# Patient Record
Sex: FEMALE | Race: BLACK OR AFRICAN AMERICAN | NOT HISPANIC OR LATINO | Employment: FULL TIME | URBAN - METROPOLITAN AREA
[De-identification: names, ages, dates, MRNs, and addresses within clinical notes are randomized per-mention and may not be internally consistent; named-entity substitution may affect disease eponyms.]

---

## 2023-03-03 LAB
CHLAMYDIA TRACH., AMPLIFIED: NEGATIVE
N. GONORRHEA, AMPLIFIED: NEGATIVE
TRICHOMONAS VAGINALIS: NEGATIVE

## 2023-03-22 LAB
AMPHETAMINE (PRESENCE) IN URINE BY SCREEN METHOD: ABNORMAL
BARBITURATES PRESENCE IN URINE BY SCREEN METHOD: ABNORMAL
BENZODIAZEPINE (PRESENCE) IN URINE BY SCREEN METHOD: ABNORMAL
CANNABINOIDS IN URINE BY SCREEN METHOD: ABNORMAL
COCAINE (PRESENCE) IN URINE BY SCREEN METHOD: ABNORMAL
DRUG SCREEN COMMENT URINE: ABNORMAL
FENTANYL URINE: ABNORMAL
METHADONE (PRESENCE) IN URINE BY SCREEN METHOD: ABNORMAL
OPIATES (PRESENCE) IN URINE BY SCREEN METHOD: ABNORMAL
OXYCODONE (PRESENCE) IN URINE BY SCREEN METHOD: ABNORMAL
PHENCYCLIDINE (PRESENCE) IN URINE BY SCREEN METHOD: ABNORMAL

## 2023-03-27 LAB
AMPHETAMINES,URINE: 4879 NG/ML
MDA,URINE: <200 NG/ML
MDEA,URINE: <200 NG/ML
MDMA,UR: <200 NG/ML
METHAMPHETAMINE QUANTITATIVE URINE: <200 NG/ML
PHENTERMINE,UR: <200 NG/ML

## 2023-11-13 DIAGNOSIS — G47.411 NARCOLEPSY AND CATAPLEXY (HHS-HCC): Primary | ICD-10-CM

## 2023-11-13 RX ORDER — DEXTROAMPHETAMINE SULFATE, DEXTROAMPHETAMINE SACCHARATE, AMPHETAMINE SULFATE AND AMPHETAMINE ASPARTATE 6.25; 6.25; 6.25; 6.25 MG/1; MG/1; MG/1; MG/1
50 CAPSULE, EXTENDED RELEASE ORAL EVERY MORNING
Qty: 60 CAPSULE | Refills: 0 | Status: SHIPPED | OUTPATIENT
Start: 2023-11-13 | End: 2024-02-12 | Stop reason: SDUPTHER

## 2023-11-13 RX ORDER — DEXTROAMPHETAMINE SULFATE, DEXTROAMPHETAMINE SACCHARATE, AMPHETAMINE SULFATE AND AMPHETAMINE ASPARTATE 6.25; 6.25; 6.25; 6.25 MG/1; MG/1; MG/1; MG/1
50 CAPSULE, EXTENDED RELEASE ORAL EVERY MORNING
Qty: 60 CAPSULE | Refills: 0 | Status: SHIPPED | OUTPATIENT
Start: 2024-01-12 | End: 2024-02-12 | Stop reason: WASHOUT

## 2023-11-13 RX ORDER — DEXTROAMPHETAMINE SULFATE, DEXTROAMPHETAMINE SACCHARATE, AMPHETAMINE SULFATE AND AMPHETAMINE ASPARTATE 6.25; 6.25; 6.25; 6.25 MG/1; MG/1; MG/1; MG/1
50 CAPSULE, EXTENDED RELEASE ORAL EVERY MORNING
Qty: 60 CAPSULE | Refills: 0 | Status: SHIPPED | OUTPATIENT
Start: 2023-12-13 | End: 2024-02-12 | Stop reason: WASHOUT

## 2023-11-13 NOTE — TELEPHONE ENCOUNTER
Veronica called for refills of her Adderall XR 25 mg x2 caps daily.  She was seen last in April with plan to follow up in 4-6 months.  OARRS was checked with  no issue of concern.  3 prescriptions were sent and Veronica was notified and told to make a follow up before additional refills can be sent.  She understands the plan and agrees. BP

## 2023-11-22 ENCOUNTER — CLINICAL SUPPORT (OUTPATIENT)
Dept: OBSTETRICS AND GYNECOLOGY | Facility: HOSPITAL | Age: 23
End: 2023-11-22
Payer: COMMERCIAL

## 2023-11-22 DIAGNOSIS — Z30.42 DEPO-PROVERA CONTRACEPTIVE STATUS: ICD-10-CM

## 2023-11-22 PROCEDURE — 2500000004 HC RX 250 GENERAL PHARMACY W/ HCPCS (ALT 636 FOR OP/ED)

## 2023-11-22 PROCEDURE — 96372 THER/PROPH/DIAG INJ SC/IM: CPT

## 2023-11-22 RX ORDER — MEDROXYPROGESTERONE ACETATE 150 MG/ML
150 INJECTION, SUSPENSION INTRAMUSCULAR ONCE
Status: COMPLETED | OUTPATIENT
Start: 2023-11-22 | End: 2023-11-22

## 2023-11-22 RX ADMIN — MEDROXYPROGESTERONE ACETATE 150 MG: 150 INJECTION, SUSPENSION INTRAMUSCULAR at 15:15

## 2024-01-17 ENCOUNTER — HOSPITAL ENCOUNTER (OUTPATIENT)
Dept: RADIOLOGY | Facility: EXTERNAL LOCATION | Age: 24
Discharge: HOME | End: 2024-01-17

## 2024-01-17 DIAGNOSIS — S99.911A RIGHT ANKLE INJURY, INITIAL ENCOUNTER: ICD-10-CM

## 2024-01-19 ENCOUNTER — OFFICE VISIT (OUTPATIENT)
Dept: ORTHOPEDIC SURGERY | Facility: CLINIC | Age: 24
End: 2024-01-19
Payer: COMMERCIAL

## 2024-01-19 DIAGNOSIS — S93.601A RIGHT FOOT SPRAIN, INITIAL ENCOUNTER: Primary | ICD-10-CM

## 2024-01-19 DIAGNOSIS — S99.911A RIGHT ANKLE INJURY, INITIAL ENCOUNTER: ICD-10-CM

## 2024-01-19 PROCEDURE — 99203 OFFICE O/P NEW LOW 30 MIN: CPT | Performed by: STUDENT IN AN ORGANIZED HEALTH CARE EDUCATION/TRAINING PROGRAM

## 2024-01-19 ASSESSMENT — PAIN DESCRIPTION - DESCRIPTORS: DESCRIPTORS: ACHING

## 2024-01-19 ASSESSMENT — PAIN - FUNCTIONAL ASSESSMENT: PAIN_FUNCTIONAL_ASSESSMENT: 0-10

## 2024-01-19 ASSESSMENT — PAIN SCALES - GENERAL: PAINLEVEL_OUTOF10: 0 - NO PAIN

## 2024-01-19 NOTE — PROGRESS NOTES
ORTHOPAEDIC SURGERY HISTORY & PHYSICAL     Chief Complaint:  Right foot pain    History Of Present Illness  Veronica Moffett is a 23 y.o. female who presents for evaluation of right foot pain.  Patient reports that she missed the last step while going downstairs.  She was initially weightbearing following the injury but developed progressively worsening pain and presented to an urgent care for evaluation.  X-rays were obtained and she was made nonweightbearing lower extremity in a walking boot with crutches.  She initially had pain in both the inside and the outside of her ankle but this has resolved.  She denies prior history of trauma or associated numbness, tingling and weakness.     Past Medical History  Past Medical History:   Diagnosis Date    30 weeks gestation of pregnancy 01/15/2019    30 weeks gestation of pregnancy    38 weeks gestation of pregnancy 03/28/2019    38 weeks gestation of pregnancy    Acute vaginitis 06/14/2021    BV (bacterial vaginosis)    Anemia complicating pregnancy, unspecified trimester 03/12/2019    Iron deficiency anemia of pregnancy    Body mass index (BMI) pediatric, greater than or equal to 95th percentile for age 11/08/2017    BMI (body mass index), pediatric, 95-99% for age    Body mass index (BMI)40.0-44.9, adult 05/25/2021    BMI 40.0-44.9, adult    Elevated blood-pressure reading, without diagnosis of hypertension 06/22/2021    Elevated blood pressure reading    Encounter for examination of blood pressure without abnormal findings 04/03/2019    Encounter for blood pressure examination    Encounter for follow-up examination after completed treatment for conditions other than malignant neoplasm 04/15/2019    Postop check    Encounter for gynecological examination (general) (routine) without abnormal findings     Well woman exam with routine gynecological exam    Encounter for immunization 01/06/2016    Immunization due    Encounter for immunization 12/02/2015    Immunization due     Encounter for pregnancy test, result positive 06/22/2021    Positive urine pregnancy test    Encounter for supervision of normal pregnancy, unspecified, first trimester     Encounter for pregnancy related examination in first trimester    Encounter for supervision of normal pregnancy, unspecified, first trimester 05/04/2021    Pregnancy with uncertain dates in first trimester    Gestational (pregnancy-induced) hypertension without significant proteinuria, third trimester 04/08/2019    Gestational hypertension without significant proteinuria during pregnancy in third trimester, antepartum    Hypersomnia, unspecified 10/13/2014    Hypersomnia    Idiopathic hypersomnia with long sleep time 08/25/2017    Idiopathic hypersomnia with long sleep time    Irregular menstruation, unspecified 04/27/2021    Missed menses    Maternal care for other (suspected) fetal abnormality and damage, fetal cardiac anomalies, not applicable or unspecified 01/22/2019    Fetal ventricular septal defect affecting antepartum care of mother    Maternal care for other (suspected) fetal abnormality and damage, fetal cardiac anomalies, not applicable or unspecified 03/19/2019    Abnormal fetal echocardiogram affecting antepartum care of mother    Nasal congestion 10/12/2015    Nasal congestion    Other general symptoms and signs 02/23/2017    Flu-like symptoms    Other problems related to lifestyle     Other problems related to lifestyle    Other specified disorders of thyroid 09/01/2015    Thyroid fullness    Other specified pregnancy related conditions, unspecified trimester 03/19/2019    Carpal tunnel syndrome during pregnancy    Personal history of other diseases of the female genital tract 01/15/2019    History of amenorrhea    Personal history of other diseases of the nervous system and sense organs 04/29/2015    History of acute conjunctivitis    Personal history of other diseases of the respiratory system 01/06/2016    History of acute  pharyngitis    Personal history of other diseases of the respiratory system 2017    History of upper respiratory infection    Personal history of other diseases of the respiratory system     History of sore throat    Personal history of other drug therapy 01/15/2019    History of influenza vaccination    Personal history of other endocrine, nutritional and metabolic disease 2019    History of obesity    Personal history of other infectious and parasitic diseases 2019    History of herpes simplex infection    Personal history of other specified conditions 2017    History of dysuria    Personal history of other specified conditions 2016    History of insomnia    Sleep apnea, unspecified 2019    Sleep-disordered breathing    Streptococcal pharyngitis 2018    Strep pharyngitis    Streptococcal pharyngitis 2018    Pharyngitis due to group A beta hemolytic Streptococci    Supervision of high risk pregnancy, unspecified, third trimester 2019    High-risk pregnancy in third trimester    Supervision of pregnancy with insufficient  care, unspecified trimester 2019    Late prenatal care    Unspecified abnormal findings in urine 10/13/2014    Abnormal urine findings    Unspecified disorder of eye and adnexa 2016    Abnormal vision screen    Vitamin D deficiency, unspecified 2015    Vitamin D deficiency    Vomiting of pregnancy, unspecified 2021    Nausea and vomiting in pregnancy prior to 22 weeks gestation       Surgical History  Recent Surgeries in Orthopaedic Surgery            No cases to display             Social History  Social History     Socioeconomic History    Marital status: Single     Spouse name: Not on file    Number of children: Not on file    Years of education: Not on file    Highest education level: Not on file   Occupational History    Not on file   Tobacco Use    Smoking status: Not on file    Smokeless tobacco: Not on  file   Substance and Sexual Activity    Alcohol use: Not on file    Drug use: Not on file    Sexual activity: Not on file   Other Topics Concern    Not on file   Social History Narrative    Not on file     Social Determinants of Health     Financial Resource Strain: Not on file   Food Insecurity: Not on file   Transportation Needs: Not on file   Physical Activity: Not on file   Stress: Not on file   Social Connections: Not on file   Intimate Partner Violence: Not on file   Housing Stability: Not on file       Family History  No family history on file.     Allergies  No Known Allergies    Review of Systems  REVIEW OF SYSTEMS  Constitutional: no unplanned weight loss  Psychiatric: no suicidal ideation  ENT: no vision changes, no sinus problems  Pulmonary: no shortness of breath during office visit today  Lymphatic: no enlarged lymph nodes  Cardiovascular: no chest pain or shortness of breath during office visit today  Gastrointestinal: no stomach problems  Genitourinary: no dysuria   Skin: no rashes  Endocrine: no thyroid problems  Neurological: no headache, no numbness  Hematological: no easy bruising  Musculoskeletal:Right foot pain    Physical Exam  PHYSICAL EXAMINATION  Constitutional Exam: well developed and well nourished  Psychiatric Exam: alert and oriented, appropriate mood and behavior  Eye Exam: EOMI  Pulmonary Exam: breathing non-labored, no apparent distress  Lymphatic exam: no appreciable lymphadenopathy in the lower extremities  Cardiovascular exam: RRR to peripheral palpation, DP pulses 2+, PT 2+, toes are pink with good capillary refill, no pitting edema  Skin exam: no open lesions, rashes, abrasions or ulcerations  Neurological exam: sensation to light touch intact in both lower extremities in peripheral and dermatomal distributions (except for any abnormalities noted in musculoskeletal exam)    Musculoskeletal exam: Right lower extremity examination.  Patient pain localized previously about the CCJ  and dorsal midfoot.  She is focally tender to palpation at the dorsal midfoot only.  Patient otherwise has pain-free and supple ankle, subtalar midtarsal joint range of motion.  Patient has no significant midfoot stress with varus/valgus/supination/pronation/abduction/abduction. Patient has sensation intact to light touch grossly in a saphenous, sural, superficial peroneal, deep peroneal and tibial nerve distribution.  Patient has 5 out of 5 strength in plantarflexion, dorsiflexion and EHL. Patient has 2+ DP/PT pulse palpated.    Last Recorded Vitals  There were no vitals taken for this visit.    Laboratory Results    No results found for this or any previous visit (from the past 24 hour(s)).    Radiology Results   X-ray imaging 3 view nonweightbearing right foot reviewed from 01/17/2024 and independently evaluated by me on 01/19/2024 demonstrates dorsal avulsion injury about naviculocuneiform articulation visualized on the lateral projection.  Single view demonstrating lateral calcaneal avulsion type injury.  No obvious midfoot disruption.    Assessment/Plan:  23-year-old female who my impression has a right foot sprain without clinical evidence of midfoot disruption.  I have reviewed the diagnosis and treatment options extensively with the patient.  In my impression the patient should begin progressive weightbearing in the right lower extremity in a short walking boot.  She may utilize crutches for assisted ambulation and OTC NSAIDs for pain relief.  I will plan to see the patient back in approximately 3 weeks to follow her clinical progress.  I anticipate transitioning the patient out of the boot and into a regular shoe pending clinical and radiographic evaluation.  Upon return, patient would require 3 views weightbearing right foot.    Trenton Sousa MD, REJI  Department of Orthopaedic Surgery  [facility]    The diagnosis and treatment plan were reviewed with the patient.  All questions were answered. The patient verbalized understanding of the treatment plan. There were no barriers to understanding identified.    Note dictated with [employer] software.  Completed without full type editing and sent to avoid delay.

## 2024-01-19 NOTE — LETTER
January 19, 2024     Patient: Veronica Moffett   YOB: 2000   Date of Visit: 1/19/2024       To Whom It May Concern:    It is my medical opinion that Veronica Moffett is restricted from driving for the foreseeable future due to a new right foot injury and should therefore be permitted to work from home until seen in the office again. Her next follow up is in approximately 3 weeks and restrictions will be re-evaluated at that time.     If you have any questions or concerns, please don't hesitate to call.         Sincerely,        Trenton Sousa MD    CC: No Recipients

## 2024-02-07 ENCOUNTER — CLINICAL SUPPORT (OUTPATIENT)
Dept: OBSTETRICS AND GYNECOLOGY | Facility: HOSPITAL | Age: 24
End: 2024-02-07
Payer: COMMERCIAL

## 2024-02-07 DIAGNOSIS — Z30.42 ENCOUNTER FOR DEPO-PROVERA CONTRACEPTION: ICD-10-CM

## 2024-02-07 PROCEDURE — 96372 THER/PROPH/DIAG INJ SC/IM: CPT

## 2024-02-07 PROCEDURE — 2500000004 HC RX 250 GENERAL PHARMACY W/ HCPCS (ALT 636 FOR OP/ED)

## 2024-02-07 RX ORDER — MEDROXYPROGESTERONE ACETATE 150 MG/ML
150 INJECTION, SUSPENSION INTRAMUSCULAR ONCE
Status: COMPLETED | OUTPATIENT
Start: 2024-02-07 | End: 2024-02-07

## 2024-02-07 RX ADMIN — MEDROXYPROGESTERONE ACETATE 150 MG: 150 INJECTION, SUSPENSION INTRAMUSCULAR at 16:05

## 2024-02-07 NOTE — PROGRESS NOTES
Medication: diazePAM 5 MG TABLET    Provider: hector Teran  Preferred Contact Number: [phone]      Pharmacy:  [facility]    Patient instructed to call pharmacy directly for future refills.      Advised patient that the nurse will call if there are questions or concerns, otherwise refill processing may take 48-72 hours.      Patient identified by name and date of birth   Patient received her last injection on 11/22/2023  Patient is due for her next injection between 04/25/24 and 05/09/24  Patient last seen in 11/22/2023  Patient will need an annual exam in 9/2024  Patient denies any concerns with injection   Patient educated on the importance of compliance  Patient given injection in the left Deltoid region, patient tolerated well  Patient encouraged to schedule return visit prior to leaving the office  Patient encouraged to call office if she has nay future questions or concerns  Patient verbalized understanding   CHET Hernandez, ADN-RN

## 2024-02-09 ENCOUNTER — OFFICE VISIT (OUTPATIENT)
Dept: ORTHOPEDIC SURGERY | Facility: CLINIC | Age: 24
End: 2024-02-09
Payer: COMMERCIAL

## 2024-02-09 ENCOUNTER — HOSPITAL ENCOUNTER (OUTPATIENT)
Dept: RADIOLOGY | Facility: CLINIC | Age: 24
Discharge: HOME | End: 2024-02-09
Payer: COMMERCIAL

## 2024-02-09 DIAGNOSIS — S93.601A RIGHT FOOT SPRAIN, INITIAL ENCOUNTER: ICD-10-CM

## 2024-02-09 PROCEDURE — 73630 X-RAY EXAM OF FOOT: CPT | Mod: RT

## 2024-02-09 PROCEDURE — 99212 OFFICE O/P EST SF 10 MIN: CPT | Performed by: STUDENT IN AN ORGANIZED HEALTH CARE EDUCATION/TRAINING PROGRAM

## 2024-02-09 ASSESSMENT — PAIN - FUNCTIONAL ASSESSMENT: PAIN_FUNCTIONAL_ASSESSMENT: NO/DENIES PAIN

## 2024-02-09 NOTE — PROGRESS NOTES
ORTHOPAEDIC SURGERY PROGRESS NOTE    Chief Complaint:  Right foot pain    History Of Present Illness  Veronica Moffett is a 23 y.o. female who presents for evaluation of right foot pain.  Patient reports that she missed the last step while going downstairs.  She was initially weightbearing following the injury but developed progressively worsening pain and presented to an urgent care for evaluation.  X-rays were obtained and she was made nonweightbearing lower extremity in a walking boot with crutches.  She initially had pain in both the inside and the outside of her ankle but this has resolved.  She denies prior history of trauma or associated numbness, tingling and weakness.    02/09/2024: Patient returns for follow-up of her right foot pain.  She has been compliant with weightbearing restrictions in a walking boot.  She is currently reporting no pain.  She is anticipating transitioning out of the boot today.  She denies new numbness, tingling or weakness.     Past Medical History  Past Medical History:   Diagnosis Date    30 weeks gestation of pregnancy 01/15/2019    30 weeks gestation of pregnancy    38 weeks gestation of pregnancy 03/28/2019    38 weeks gestation of pregnancy    Acute vaginitis 06/14/2021    BV (bacterial vaginosis)    Anemia complicating pregnancy, unspecified trimester 03/12/2019    Iron deficiency anemia of pregnancy    Body mass index (BMI) pediatric, greater than or equal to 95th percentile for age 11/08/2017    BMI (body mass index), pediatric, 95-99% for age    Body mass index (BMI)40.0-44.9, adult 05/25/2021    BMI 40.0-44.9, adult    Elevated blood-pressure reading, without diagnosis of hypertension 06/22/2021    Elevated blood pressure reading    Encounter for examination of blood pressure without abnormal findings 04/03/2019    Encounter for blood pressure examination    Encounter for follow-up examination after completed treatment for conditions other than malignant neoplasm  04/15/2019    Postop check    Encounter for gynecological examination (general) (routine) without abnormal findings     Well woman exam with routine gynecological exam    Encounter for immunization 01/06/2016    Immunization due    Encounter for immunization 12/02/2015    Immunization due    Encounter for pregnancy test, result positive 06/22/2021    Positive urine pregnancy test    Encounter for supervision of normal pregnancy, unspecified, first trimester     Encounter for pregnancy related examination in first trimester    Encounter for supervision of normal pregnancy, unspecified, first trimester 05/04/2021    Pregnancy with uncertain dates in first trimester    Gestational (pregnancy-induced) hypertension without significant proteinuria, third trimester 04/08/2019    Gestational hypertension without significant proteinuria during pregnancy in third trimester, antepartum    Hypersomnia, unspecified 10/13/2014    Hypersomnia    Idiopathic hypersomnia with long sleep time 08/25/2017    Idiopathic hypersomnia with long sleep time    Irregular menstruation, unspecified 04/27/2021    Missed menses    Maternal care for other (suspected) fetal abnormality and damage, fetal cardiac anomalies, not applicable or unspecified 01/22/2019    Fetal ventricular septal defect affecting antepartum care of mother    Maternal care for other (suspected) fetal abnormality and damage, fetal cardiac anomalies, not applicable or unspecified 03/19/2019    Abnormal fetal echocardiogram affecting antepartum care of mother    Nasal congestion 10/12/2015    Nasal congestion    Other general symptoms and signs 02/23/2017    Flu-like symptoms    Other problems related to lifestyle     Other problems related to lifestyle    Other specified disorders of thyroid 09/01/2015    Thyroid fullness    Other specified pregnancy related conditions, unspecified trimester 03/19/2019    Carpal tunnel syndrome during pregnancy    Personal history of other  diseases of the female genital tract 01/15/2019    History of amenorrhea    Personal history of other diseases of the nervous system and sense organs 2015    History of acute conjunctivitis    Personal history of other diseases of the respiratory system 2016    History of acute pharyngitis    Personal history of other diseases of the respiratory system 2017    History of upper respiratory infection    Personal history of other diseases of the respiratory system     History of sore throat    Personal history of other drug therapy 01/15/2019    History of influenza vaccination    Personal history of other endocrine, nutritional and metabolic disease 2019    History of obesity    Personal history of other infectious and parasitic diseases 2019    History of herpes simplex infection    Personal history of other specified conditions 2017    History of dysuria    Personal history of other specified conditions 2016    History of insomnia    Sleep apnea, unspecified 2019    Sleep-disordered breathing    Streptococcal pharyngitis 2018    Strep pharyngitis    Streptococcal pharyngitis 2018    Pharyngitis due to group A beta hemolytic Streptococci    Supervision of high risk pregnancy, unspecified, third trimester 2019    High-risk pregnancy in third trimester    Supervision of pregnancy with insufficient  care, unspecified trimester 2019    Late prenatal care    Unspecified abnormal findings in urine 10/13/2014    Abnormal urine findings    Unspecified disorder of eye and adnexa 2016    Abnormal vision screen    Vitamin D deficiency, unspecified 2015    Vitamin D deficiency    Vomiting of pregnancy, unspecified 2021    Nausea and vomiting in pregnancy prior to 22 weeks gestation       Surgical History  Recent Surgeries in Orthopaedic Surgery            No cases to display             Social History  Social History      Socioeconomic History    Marital status: Single     Spouse name: None    Number of children: None    Years of education: None    Highest education level: None   Occupational History    None   Tobacco Use    Smoking status: None    Smokeless tobacco: None   Substance and Sexual Activity    Alcohol use: None    Drug use: None    Sexual activity: None   Other Topics Concern    None   Social History Narrative    None     Social Determinants of Health     Financial Resource Strain: Not on file   Food Insecurity: Not on file   Transportation Needs: Not on file   Physical Activity: Not on file   Stress: Not on file   Social Connections: Not on file   Intimate Partner Violence: Not on file   Housing Stability: Not on file       Family History  No family history on file.     Allergies  No Known Allergies    Review of Systems  REVIEW OF SYSTEMS  Constitutional: no unplanned weight loss  Psychiatric: no suicidal ideation  ENT: no vision changes, no sinus problems  Pulmonary: no shortness of breath during office visit today  Lymphatic: no enlarged lymph nodes  Cardiovascular: no chest pain or shortness of breath during office visit today  Gastrointestinal: no stomach problems  Genitourinary: no dysuria   Skin: no rashes  Endocrine: no thyroid problems  Neurological: no headache, no numbness  Hematological: no easy bruising  Musculoskeletal:Right foot pain    Physical Exam  PHYSICAL EXAMINATION  Constitutional Exam: well developed and well nourished  Psychiatric Exam: alert and oriented, appropriate mood and behavior  Eye Exam: EOMI  Pulmonary Exam: breathing non-labored, no apparent distress  Lymphatic exam: no appreciable lymphadenopathy in the lower extremities  Cardiovascular exam: RRR to peripheral palpation, DP pulses 2+, PT 2+, toes are pink with good capillary refill, no pitting edema  Skin exam: no open lesions, rashes, abrasions or ulcerations  Neurological exam: sensation to light touch intact in both lower  extremities in peripheral and dermatomal distributions (except for any abnormalities noted in musculoskeletal exam)    Musculoskeletal exam: Right lower extremity examination.  Patient without localizable tenderness to palpation on examination today.  Patient has no significant pain with midfoot stress including varus/valgus/supination/pronation/abduction/adduction.  Patient has pain-free and supple ankle, subtalar and midtarsal joint range of motion.  Patient has sensation intact to light touch grossly in a saphenous, sural, superficial peroneal, deep peroneal and tibial nerve distribution.  Patient has 5 out of 5 strength in plantarflexion, dorsiflexion and EHL. Patient has 2+ DP/PT pulse palpated.    Last Recorded Vitals  There were no vitals taken for this visit.    Laboratory Results    No results found for this or any previous visit (from the past 24 hour(s)).    Radiology Results  X-ray imaging 3 view weightbearing right foot reviewed from 02/09/2024 and independently evaluated by me demonstrates previously visualized dorsal avulsion injury without associated midfoot injury on weightbearing examination.    Assessment/Plan:  23-year-old female who my impression has a right foot sprain without clinical evidence of midfoot disruption with both clinical and radiographic evidence of healing.  In my impression the patient may begin weightbearing as tolerated in her right lower extremity and steadily transition out of the walking boot into regular shoe.  I have no restrictions for the patient at this time.  I will plan to see the patient back on an as-needed basis.  I encouraged the patient to contact the office if he develops any new pain, worsening symptoms if she has any further questions.  Upon return, patient require 3 view weightbearing right foot.    Trenton Sousa MD, REJI  Department of Orthopaedic Surgery  [facility]    The diagnosis and treatment plan were reviewed  with the patient. All questions were answered. The patient verbalized understanding of the treatment plan. There were no barriers to understanding identified.    Note dictated with [employer] software.  Completed without full type editing and sent to avoid delay.

## 2024-02-12 DIAGNOSIS — G47.411 NARCOLEPSY AND CATAPLEXY (HHS-HCC): ICD-10-CM

## 2024-02-12 RX ORDER — DEXTROAMPHETAMINE SULFATE, DEXTROAMPHETAMINE SACCHARATE, AMPHETAMINE SULFATE AND AMPHETAMINE ASPARTATE 6.25; 6.25; 6.25; 6.25 MG/1; MG/1; MG/1; MG/1
50 CAPSULE, EXTENDED RELEASE ORAL EVERY MORNING
Qty: 60 CAPSULE | Refills: 0 | Status: SHIPPED | OUTPATIENT
Start: 2024-02-12 | End: 2024-02-12 | Stop reason: CLARIF

## 2024-02-12 NOTE — TELEPHONE ENCOUNTER
Patient called for refills of Adderall XR 25mg. This RN ran OARRs with no concern; patient is consistent with refills. Patient has upcoming appointment at the end of the month, so once script entered for Dr. Peña to authorize to get patient to appointment.

## 2024-02-13 RX ORDER — DEXTROAMPHETAMINE SACCHARATE, AMPHETAMINE ASPARTATE MONOHYDRATE, DEXTROAMPHETAMINE SULFATE AND AMPHETAMINE SULFATE 6.25; 6.25; 6.25; 6.25 MG/1; MG/1; MG/1; MG/1
50 CAPSULE, EXTENDED RELEASE ORAL EVERY MORNING
Qty: 60 CAPSULE | Refills: 0 | Status: SHIPPED | OUTPATIENT
Start: 2024-02-13 | End: 2024-02-26 | Stop reason: SDUPTHER

## 2024-02-26 ENCOUNTER — OFFICE VISIT (OUTPATIENT)
Dept: SLEEP MEDICINE | Facility: CLINIC | Age: 24
End: 2024-02-26
Payer: COMMERCIAL

## 2024-02-26 VITALS
BODY MASS INDEX: 42.17 KG/M2 | DIASTOLIC BLOOD PRESSURE: 76 MMHG | SYSTOLIC BLOOD PRESSURE: 109 MMHG | HEART RATE: 85 BPM | HEIGHT: 64 IN | WEIGHT: 247 LBS | OXYGEN SATURATION: 98 %

## 2024-02-26 DIAGNOSIS — G47.411 NARCOLEPSY AND CATAPLEXY (HHS-HCC): ICD-10-CM

## 2024-02-26 DIAGNOSIS — G47.411 NARCOLEPSY WITH CATAPLEXY (HHS-HCC): Primary | ICD-10-CM

## 2024-02-26 PROCEDURE — 99214 OFFICE O/P EST MOD 30 MIN: CPT | Performed by: INTERNAL MEDICINE

## 2024-02-26 RX ORDER — DEXTROAMPHETAMINE SACCHARATE, AMPHETAMINE ASPARTATE MONOHYDRATE, DEXTROAMPHETAMINE SULFATE AND AMPHETAMINE SULFATE 6.25; 6.25; 6.25; 6.25 MG/1; MG/1; MG/1; MG/1
50 CAPSULE, EXTENDED RELEASE ORAL EVERY MORNING
Qty: 60 CAPSULE | Refills: 0 | Status: SHIPPED | OUTPATIENT
Start: 2024-04-08

## 2024-02-26 RX ORDER — DEXTROAMPHETAMINE SACCHARATE, AMPHETAMINE ASPARTATE MONOHYDRATE, DEXTROAMPHETAMINE SULFATE AND AMPHETAMINE SULFATE 6.25; 6.25; 6.25; 6.25 MG/1; MG/1; MG/1; MG/1
50 CAPSULE, EXTENDED RELEASE ORAL EVERY MORNING
Qty: 60 CAPSULE | Refills: 0 | Status: SHIPPED | OUTPATIENT
Start: 2024-05-06

## 2024-02-26 ASSESSMENT — COLUMBIA-SUICIDE SEVERITY RATING SCALE - C-SSRS
2. HAVE YOU ACTUALLY HAD ANY THOUGHTS OF KILLING YOURSELF?: NO
1. IN THE PAST MONTH, HAVE YOU WISHED YOU WERE DEAD OR WISHED YOU COULD GO TO SLEEP AND NOT WAKE UP?: NO
6. HAVE YOU EVER DONE ANYTHING, STARTED TO DO ANYTHING, OR PREPARED TO DO ANYTHING TO END YOUR LIFE?: NO

## 2024-02-26 ASSESSMENT — PAIN SCALES - GENERAL: PAINLEVEL: 0-NO PAIN

## 2024-02-26 ASSESSMENT — ENCOUNTER SYMPTOMS
OCCASIONAL FEELINGS OF UNSTEADINESS: 0
DEPRESSION: 0
LOSS OF SENSATION IN FEET: 0

## 2024-02-26 ASSESSMENT — PATIENT HEALTH QUESTIONNAIRE - PHQ9
1. LITTLE INTEREST OR PLEASURE IN DOING THINGS: NOT AT ALL
2. FEELING DOWN, DEPRESSED OR HOPELESS: NOT AT ALL
SUM OF ALL RESPONSES TO PHQ9 QUESTIONS 1 AND 2: 0

## 2024-02-26 NOTE — PROGRESS NOTES
Patient: Veronica Moffett   Patient info: [id number]  : 2000 -- AGE 23 y.o.    Clinician: Taty Peña MD   Location [facility]   Service Date: 2024          [facility]  Follow-up Visit Note       HISTORY OF PRESENT ILLNESS     Veronica Moffett is a 23 y.o. female who presents to a [facility] for followup.     The patient  has a past medical history of 30 weeks gestation of pregnancy (01/15/2019), 38 weeks gestation of pregnancy (2019), Acute vaginitis (2021), Anemia complicating pregnancy, unspecified trimester (2019), Body mass index (BMI) pediatric, greater than or equal to 95th percentile for age (2017), Body mass index (BMI)40.0-44.9, adult (2021), Elevated blood-pressure reading, without diagnosis of hypertension (2021), Encounter for examination of blood pressure without abnormal findings (2019), Encounter for follow-up examination after completed treatment for conditions other than malignant neoplasm (04/15/2019), Encounter for gynecological examination (general) (routine) without abnormal findings, Encounter for immunization (2016), Encounter for immunization (2015), Encounter for pregnancy test, result positive (2021), Encounter for supervision of normal pregnancy, unspecified, first trimester, Encounter for supervision of normal pregnancy, unspecified, first trimester (2021), Gestational (pregnancy-induced) hypertension without significant proteinuria, third trimester (2019), Hypersomnia, unspecified (10/13/2014), Idiopathic hypersomnia with long sleep time (2017), Irregular menstruation, unspecified (2021), Maternal care for other (suspected) fetal abnormality and damage, fetal cardiac anomalies, not applicable or unspecified (2019), Maternal care for other (suspected) fetal abnormality and damage, fetal  cardiac anomalies, not applicable or unspecified (2019), Nasal congestion (10/12/2015), Other general symptoms and signs (2017), Other problems related to lifestyle, Other specified disorders of thyroid (2015), Other specified pregnancy related conditions, unspecified trimester (2019), Personal history of other diseases of the female genital tract (01/15/2019), Personal history of other diseases of the nervous system and sense organs (2015), Personal history of other diseases of the respiratory system (2016), Personal history of other diseases of the respiratory system (2017), Personal history of other diseases of the respiratory system, Personal history of other drug therapy (01/15/2019), Personal history of other endocrine, nutritional and metabolic disease (2019), Personal history of other infectious and parasitic diseases (2019), Personal history of other specified conditions (2017), Personal history of other specified conditions (2016), Sleep apnea, unspecified (2019), Streptococcal pharyngitis (2018), Streptococcal pharyngitis (2018), Supervision of high risk pregnancy, unspecified, third trimester (2019), Supervision of pregnancy with insufficient  care, unspecified trimester (2019), Unspecified abnormal findings in urine (10/13/2014), Unspecified disorder of eye and adnexa (2016), Vitamin D deficiency, unspecified (2015), and Vomiting of pregnancy, unspecified (2021).    PAST SLEEP HISTORY  Last seen: 3/2023  Summary of last visit:   22 year female with Narcolepsy Type I (N-C) PSG-MSLT  (MSL 1.8 minutes; 2 SOREMPs - naps 1 and nap 4;. Prior to this, had cataplexy facies, and high suspicion despite the initial MSLT not meeting criteria.      Course: Responded best to Adderall 50mg ER in am (failed Vyvanse/Concerta in the past; Provigil- headaches, and did not work; tried melatonin and  endorsed feeling depressed). Did OK with both pregnancies and postpartum while on meds. (decision was made to continue after risk/benefit discussion, did not wish to change the course.) Discussed Xyrem and modafinil in the past. Patient understands there is room for improvement, but not interested in adding or changing medications at this time. She is very clear about this.      However, she will read more about Xyrem, slightly hesitant as she has had negative experiences with medication changes in the past (educated that this would be an addition to Adderall, no withdrawal process) and also worried about not being able to wake up if her 1 year old needed her at night.      Sleep maintenance: may be narcolepsy vs. mild TROY PSG by 2017 (AHI 11), and has not had worsening of snoring; associated now with 2-3 nighttime awakenings. Mild tonsillar hypertrophy (2+) and elevated BMI. (HST 2020 AHI 8). During pregnancy did not develop gestational DM/HTN and has lost significant weight postpartum.      Management:   - Continue Adderall ER 25 mg 2 tab in AM  - We will continue to follow up on TROY symptoms and consider treating TROY if worsening symptoms.      Diagnostics: None     Education:   - Encouraged wt loss, diet and exercise; promote healthy weight and target mild TROY  - Safety with driving; reviewed potential for Xyrem in the future. Counseled not do dive while sleepy or drowsy. Pt will let us know if there are any issues.  - Patient continues to breastfeed part time, but minimal. Risks and benefits of using using stimulants during breast feeding were discussed at length and patient made a decision to continue using it as risk of going off medications were higher in patients perspective as she had a very hard time (had suicidal thoughts and was poor functioning) being off stimulants previously.         MANAGEMENT OF CONTROLLED SUBSTANCE  - OOARS: I have personally reviewed the OARRS report for this patient. I have  considered the risks of abuse, dependence, addiction and diversion; patient is taking the prescribed medications as prescribed, consistent with history. No unusual activity.  - CSA (Controlled Substance Agreement) YEARLY: done today and will be scanned in the EMR   - Education on controlled medications performed, along with refill policy  - Urine Drug Screen (UDS) yearly (if 18 and older)  - Urine confirmatory testing with UDS    RTC in 6 months, call for refills in interim     INTERIM DATA REVIEW:  Personally reviewed any raw data such as interpretation report, data sheet, hypnogram, and titration table if available and applicable  Notes and encounters in interim  ESS: 11/24  KIET 18/28  Fosq 31/40    CURRENT HISTORY/CONCERNS  On today's visit, the patient reports doing ok.   Doing the same meds and she would not like to change anything   Taking Adderall XR 50mg (2 of the 25mg) and this works and she does not want to change things     Cataplexy: super rare  Night time sleep: poor sleep in general  No HH/sleep paralysis    Daytime Symptoms  No concerns  No driving past a certain time    Sleep-Wake Schedule:   Bedtime: 9;30pm asleep 10pm  Sleep latency: quickly or within 30 minutes (kids may be up)  Wake time: 7-7:30am on weeknds, 8-9am weekends   Awakenings: few times a night, not due to breathing    Naps afternoon evening 1 hour on weekends (RARE)  - not daily;      REVIEW OF SYSTEMS     Review of Systems    ALLERGIES AND MEDICATIONS     Allergies   Allergen Reactions    Kiwi Hives       MEDICATIONS:     Current Outpatient Medications:     amphetamine-dextroamphetamine XR (Adderall XR) 25 mg 24 hr capsule, Take 2 capsules (50 mg) by mouth once daily in the morning. Do not crush or chew., Disp: 60 capsule, Rfl: 0      HISTORIES   PAST MEDICAL HISTORY : She  has a past medical history of 30 weeks gestation of pregnancy (01/15/2019), 38 weeks gestation of pregnancy (03/28/2019), Acute vaginitis (06/14/2021), Anemia  complicating pregnancy, unspecified trimester (03/12/2019), Body mass index (BMI) pediatric, greater than or equal to 95th percentile for age (11/08/2017), Body mass index (BMI)40.0-44.9, adult (05/25/2021), Elevated blood-pressure reading, without diagnosis of hypertension (06/22/2021), Encounter for examination of blood pressure without abnormal findings (04/03/2019), Encounter for follow-up examination after completed treatment for conditions other than malignant neoplasm (04/15/2019), Encounter for gynecological examination (general) (routine) without abnormal findings, Encounter for immunization (01/06/2016), Encounter for immunization (12/02/2015), Encounter for pregnancy test, result positive (06/22/2021), Encounter for supervision of normal pregnancy, unspecified, first trimester, Encounter for supervision of normal pregnancy, unspecified, first trimester (05/04/2021), Gestational (pregnancy-induced) hypertension without significant proteinuria, third trimester (04/08/2019), Hypersomnia, unspecified (10/13/2014), Idiopathic hypersomnia with long sleep time (08/25/2017), Irregular menstruation, unspecified (04/27/2021), Maternal care for other (suspected) fetal abnormality and damage, fetal cardiac anomalies, not applicable or unspecified (01/22/2019), Maternal care for other (suspected) fetal abnormality and damage, fetal cardiac anomalies, not applicable or unspecified (03/19/2019), Nasal congestion (10/12/2015), Other general symptoms and signs (02/23/2017), Other problems related to lifestyle, Other specified disorders of thyroid (09/01/2015), Other specified pregnancy related conditions, unspecified trimester (03/19/2019), Personal history of other diseases of the female genital tract (01/15/2019), Personal history of other diseases of the nervous system and sense organs (04/29/2015), Personal history of other diseases of the respiratory system (01/06/2016), Personal history of other diseases of the  "respiratory system (2017), Personal history of other diseases of the respiratory system, Personal history of other drug therapy (01/15/2019), Personal history of other endocrine, nutritional and metabolic disease (2019), Personal history of other infectious and parasitic diseases (2019), Personal history of other specified conditions (2017), Personal history of other specified conditions (2016), Sleep apnea, unspecified (2019), Streptococcal pharyngitis (2018), Streptococcal pharyngitis (2018), Supervision of high risk pregnancy, unspecified, third trimester (2019), Supervision of pregnancy with insufficient  care, unspecified trimester (2019), Unspecified abnormal findings in urine (10/13/2014), Unspecified disorder of eye and adnexa (2016), Vitamin D deficiency, unspecified (2015), and Vomiting of pregnancy, unspecified (2021).  PAST SURGICAL HISTORY: She  has a past surgical history that includes Other surgical history (2019).   FAMILY HISTORY: No changes since previous visit. Otherwise non-contributory as charted.     SOCIAL HISTORY  Patient:  reports that she has never smoked. She has never used smokeless tobacco. She reports that she does not drink alcohol and does not use drugs.   Has 2 kids at home  Good support at home  Parents are home, sister and grandmother    PHYSICAL EXAM     VITAL SIGNS: /76 (BP Location: Right arm, Patient Position: Sitting, BP Cuff Size: Large adult long)   Pulse 85   Ht 1.626 m (5' 4\")   Wt 112 kg (247 lb)   SpO2 98%   BMI 42.40 kg/m²   PREVIOUS WEIGHTS:  Wt Readings from Last 3 Encounters:   24 112 kg (247 lb)   23 108 kg (238 lb)   23 108 kg (237 lb 3.4 oz)     EXAM:  General: Alert, attentive, in NAD  HEENT: Nares patent, oropharynx is Mallampati class 2-3 uvula: midline nonedematous,  tongue scalloping   Dentition/Jaw: appropriate dentition;    Neck: no " "visible masses  Heart: RRR, no murmur  Lungs: Clear no cough  Extremities: warm, well perfused, no visible edema, normal joints,   Skin: no visible rash   Neurologic: face symmetric   Psychiatric: Affect appropriate      OTHER RESULTS/DATA     Lab Review  CBC:   Lab Results   Component Value Date    WBC 13.4 (H) 12/14/2021    HGB 11.2 (L) 12/14/2021    HCT 34.5 (L) 12/14/2021    MCV 92 12/14/2021     12/14/2021    TSH: No results found for: \"TSH\"; BMP:   Lab Results   Component Value Date    GLUCOSE 92 12/13/2021    CALCIUM 8.8 12/13/2021     12/13/2021    K 4.5 12/13/2021    CO2 21 12/13/2021     12/13/2021    BUN 6 12/13/2021    CREATININE 0.45 (L) 12/13/2021       Urine Screen:   Pain Management Panel          Latest Ref Rng & Units 3/22/2023   Pain Management Panel   Amphetamine Screen, Urine NEGATIVE PRESUMPTIVE POSITIVE    Barbiturate Screen, Urine NEGATIVE PRESUMPTIVE NEGATIVE    Fentanyl Screen, Urine NEGATIVE PRESUMPTIVE NEGATIVE    Methadone Screen, Urine NEGATIVE PRESUMPTIVE NEGATIVE        ASSESSMENT/PLAN   Ms. Moffett is a 23 y.o. female  returning to the  Sleep Medicine Clinic for follow-up of NT1 doing well on wake promoting agents but could be more functional. We talked about newer medications and potentially an oxybate which she is now going to consider (prior could not due to concern about middle of night attending to children).    Problem List and Plan/Orders  Problem List Items Addressed This Visit    None  Visit Diagnoses       Narcolepsy with cataplexy ([facility])    -  Primary    Relevant Orders    Drug Screen, Urine With Reflex to Confirmation    Narcolepsy and cataplexy ([facility])        Relevant Medications    amphetamine-dextroamphetamine XR (Adderall XR) 25 mg 24 hr capsule    amphetamine-dextroamphetamine XR (Adderall XR) 25 mg 24 hr capsule (Start on 5/6/2024)        Narcolepsy Type I (N-C) PSG-MSLT 2017 (MSL 1.8 minutes; 2 SOREMPs - naps 1 and nap 4;. Prior to this, " "had cataplexy facies, and high suspicion despite the initial MSLT not meeting criteria.     Course: Responded best to Adderall 50mg ER in am (failed Vyvanse/Concerta in the past; Provigil- headaches, and did not work; tried melatonin and endorsed feeling depressed -\"brain did not like it\". Did OK with both pregnancies and postpartum while on meds. (decision was made to continue after risk/benefit discussion, did not wish to change the course.) Discussed Xyrem and modafinil in the past. Patient understands there is room for improvement, but not interested in adding or changing medications at this time.       Does not want to do twice nightly oxybate Xyrem/Xywav and also worried about not being able to wake up if her 1 year old needed her at night.     Does not mind doing something like once nightly but not willing to be off the Adderall    CGI:  3- somewhat improved     Recommendations/Plan/Management:  Continue Adderall ER 50mg (25mg x 2) daily  Monitor breathing; may need to repeat HSAT (last testing in 2020- was HSAT)   Medication adjustments:  consider Lumyrz, she is concerned about waking at night.  Kids are in her room; has issues with some behavioral of waking for her son.  She would like to trial to see if this something that could benefit her and still be able to wake up.   Trial period with 4.5g to start  Adequate sleep duration and appropriate timing    Diagnostics:  consider Future HSAT while on Lumyrz  Education:   about Lumyrz    MANAGEMENT OF CONTROLLED SUBSTANCES  - OOARS: I have personally reviewed the OARRS report for this patient. I have considered the risks of abuse, dependence, addiction and diversion; patient is taking the prescribed medications as prescribed, consistent with history. No unusual activity.  - CSA (Controlled Substance Agreement) YEARLY: done today   - Education on controlled medications performed, along with refill policy  - Urine Drug Screen (UDS) yearly: done consistent.   - " Yearly in person followup      Followup: 6 months    Taty Peña MD

## 2024-02-26 NOTE — PATIENT INSTRUCTIONS
"     NAME: Veronica Moffett   DATE:  2/26/2024     Your Sleep Physician Today: Taty Peña MD  Your Primary Care Physician: No Assigned PCP Generic Provider, MD   Diagnosis:   1. Narcolepsy with cataplexy  Drug Screen, Urine With Reflex to Confirmation      2. Narcolepsy and cataplexy  amphetamine-dextroamphetamine XR (Adderall XR) 25 mg 24 hr capsule    amphetamine-dextroamphetamine XR (Adderall XR) 25 mg 24 hr capsule           Thank you for coming to the Sleep Medicine Clinic today!  Below is a summary of your treatment plan, other important information, and our contact numbers:    TREATMENT PLAN     Refills today  Lumyrz-- will get things started for this medication to trial    Follow-up Appointment:   6 months     IMPORTANT PHONE NUMBERS     Appointments (for Pediatric Sleep Clinic): [phone] - option 1  Appointments (for Adult Sleep Clinic): [phone] - option 2  Appointments (For Sleep Studies): [phone] - option 3  OUR ADULT SLEEP MEDICINE TEAM   Please do not hesitate to call the office or sleep nurse with any questions between appointments:    Adult Sleep Nurses (Sharron Gonzales, RN and Katya Cline, RN):  For clinical questions and refilling prescriptions: [phone]  Email sleep diaries and other documents at: [email or web address]    Adult Sleep Medicine :  Gita Carter (For Juanis/Peña/Sarah/Armond/Raheem/Eliezer):   P: [phone]  F: [phone]    Adult Sleep Medicine Advanced Practice Providers:  Rodney Villanueva ([place], [place])  Chioma Adrian ([facility])  Jes Griffiths CNP ([place], [place], [place])  Stephany Smith CNP ([place], [place], [place])  Lizzie Baca ([place], [place], [place])  Guille Kimbrough CNP (Critical access hospital)       CONTACTING YOUR SLEEP MEDICINE DOCTOR- Getting in touch     Send a message directly to your provider through \"My Chart\", which is the email service through your  Records " "Account: [email or web address]   Call [phone] and leave a message. One of the administrative assistants will forward the message to your sleep medicine provider through \"My Chart\" and/or email.   Sleep nurse: For clinical questions and refilling prescriptions: [phone]; or by email: [email or web address]    Please do not hesitate to reach out if you have pending questions.    Taty Peña MD       "

## 2024-04-15 ENCOUNTER — TELEPHONE (OUTPATIENT)
Dept: SLEEP MEDICINE | Facility: HOSPITAL | Age: 24
End: 2024-04-15

## 2024-04-15 NOTE — TELEPHONE ENCOUNTER
Submitted REMS enrollment forms via online [facility] portal for patient and Dr. Peña to complete.

## 2024-04-25 ENCOUNTER — PROCEDURE VISIT (OUTPATIENT)
Dept: OBSTETRICS AND GYNECOLOGY | Facility: HOSPITAL | Age: 24
End: 2024-04-25
Payer: COMMERCIAL

## 2024-04-25 DIAGNOSIS — Z30.42 ENCOUNTER FOR SURVEILLANCE OF INJECTABLE CONTRACEPTIVE: Primary | ICD-10-CM

## 2024-04-25 RX ORDER — MEDROXYPROGESTERONE ACETATE 150 MG/ML
150 INJECTION, SUSPENSION INTRAMUSCULAR ONCE
Status: SHIPPED | OUTPATIENT
Start: 2024-04-25

## 2024-04-25 NOTE — PROGRESS NOTES
Pt arrived for depo  Last depo administered 2/7/23  Last APE 3/2023  Depo given in the right deltoid  Pt tolerated the injection well  Pt educated on long term depo usage, pt verbalized understanding.  Pt instructed to return July 11 - July 25, 2024 and must have annual on or before this time.  Depo supplied by office.

## 2024-06-11 DIAGNOSIS — G47.411 NARCOLEPSY AND CATAPLEXY (HHS-HCC): ICD-10-CM

## 2024-06-12 RX ORDER — DEXTROAMPHETAMINE SACCHARATE, AMPHETAMINE ASPARTATE MONOHYDRATE, DEXTROAMPHETAMINE SULFATE AND AMPHETAMINE SULFATE 6.25; 6.25; 6.25; 6.25 MG/1; MG/1; MG/1; MG/1
50 CAPSULE, EXTENDED RELEASE ORAL EVERY MORNING
Qty: 60 CAPSULE | Refills: 0 | Status: SHIPPED | OUTPATIENT
Start: 2024-08-09

## 2024-06-12 RX ORDER — DEXTROAMPHETAMINE SACCHARATE, AMPHETAMINE ASPARTATE MONOHYDRATE, DEXTROAMPHETAMINE SULFATE AND AMPHETAMINE SULFATE 6.25; 6.25; 6.25; 6.25 MG/1; MG/1; MG/1; MG/1
50 CAPSULE, EXTENDED RELEASE ORAL EVERY MORNING
Qty: 60 CAPSULE | Refills: 0 | Status: SHIPPED | OUTPATIENT
Start: 2024-07-10 | End: 2024-08-09

## 2024-06-12 RX ORDER — DEXTROAMPHETAMINE SACCHARATE, AMPHETAMINE ASPARTATE MONOHYDRATE, DEXTROAMPHETAMINE SULFATE AND AMPHETAMINE SULFATE 6.25; 6.25; 6.25; 6.25 MG/1; MG/1; MG/1; MG/1
50 CAPSULE, EXTENDED RELEASE ORAL EVERY MORNING
Qty: 60 CAPSULE | Refills: 0 | Status: SHIPPED | OUTPATIENT
Start: 2024-06-12 | End: 2024-07-12

## 2024-07-09 RX ORDER — MEDROXYPROGESTERONE ACETATE 150 MG/ML
150 INJECTION, SUSPENSION INTRAMUSCULAR ONCE
OUTPATIENT
Start: 2024-07-09 | End: 2024-07-09

## 2024-07-10 ENCOUNTER — APPOINTMENT (OUTPATIENT)
Dept: OBSTETRICS AND GYNECOLOGY | Facility: HOSPITAL | Age: 24
End: 2024-07-10
Payer: COMMERCIAL

## 2024-07-11 ENCOUNTER — APPOINTMENT (OUTPATIENT)
Dept: OBSTETRICS AND GYNECOLOGY | Facility: CLINIC | Age: 24
End: 2024-07-11
Payer: COMMERCIAL

## 2024-07-15 ENCOUNTER — APPOINTMENT (OUTPATIENT)
Dept: OBSTETRICS AND GYNECOLOGY | Facility: HOSPITAL | Age: 24
End: 2024-07-15
Payer: COMMERCIAL

## 2024-07-25 ENCOUNTER — APPOINTMENT (OUTPATIENT)
Dept: OBSTETRICS AND GYNECOLOGY | Facility: HOSPITAL | Age: 24
End: 2024-07-25
Payer: COMMERCIAL

## 2024-08-02 ENCOUNTER — APPOINTMENT (OUTPATIENT)
Dept: OBSTETRICS AND GYNECOLOGY | Facility: HOSPITAL | Age: 24
End: 2024-08-02
Payer: COMMERCIAL

## 2024-08-13 ENCOUNTER — TELEPHONE (OUTPATIENT)
Dept: SLEEP MEDICINE | Facility: HOSPITAL | Age: 24
End: 2024-08-13
Payer: COMMERCIAL

## 2024-08-13 NOTE — TELEPHONE ENCOUNTER
Pt called because pharmacy only gave half of her adderall prescription on 8/9 due to short supply. Pt will let us know where to send the rest of her shipment when she can find a pharmacy with it in stock.

## 2024-08-19 DIAGNOSIS — G47.411 NARCOLEPSY AND CATAPLEXY (HHS-HCC): ICD-10-CM

## 2024-08-19 RX ORDER — DEXTROAMPHETAMINE SACCHARATE, AMPHETAMINE ASPARTATE MONOHYDRATE, DEXTROAMPHETAMINE SULFATE AND AMPHETAMINE SULFATE 6.25; 6.25; 6.25; 6.25 MG/1; MG/1; MG/1; MG/1
50 CAPSULE, EXTENDED RELEASE ORAL EVERY MORNING
Qty: 30 CAPSULE | Refills: 0 | Status: SHIPPED | OUTPATIENT
Start: 2024-08-19 | End: 2024-09-03

## 2024-08-19 NOTE — TELEPHONE ENCOUNTER
Pt called to request refill for 15 days prescription for adderall XR. #30 for 15 days since previous pharmacy only had limited stock of meds available.     This RN checked OARRS and it is consist with prescribed medications. Patient has been filling Rx appropriately. Prescription request sent to provider to review.

## 2024-08-26 ENCOUNTER — OFFICE VISIT (OUTPATIENT)
Dept: SLEEP MEDICINE | Facility: CLINIC | Age: 24
End: 2024-08-26
Payer: COMMERCIAL

## 2024-08-26 ENCOUNTER — LAB (OUTPATIENT)
Dept: LAB | Facility: LAB | Age: 24
End: 2024-08-26
Payer: COMMERCIAL

## 2024-08-26 DIAGNOSIS — G47.411 NARCOLEPSY AND CATAPLEXY (HHS-HCC): ICD-10-CM

## 2024-08-26 DIAGNOSIS — G47.411 NARCOLEPSY WITH CATAPLEXY (HHS-HCC): ICD-10-CM

## 2024-08-26 DIAGNOSIS — G47.411 NARCOLEPSY WITH CATAPLEXY (HHS-HCC): Primary | ICD-10-CM

## 2024-08-26 DIAGNOSIS — G47.30 SLEEP-DISORDERED BREATHING: ICD-10-CM

## 2024-08-26 LAB
AMPHETAMINES UR QL SCN: ABNORMAL
BARBITURATES UR QL SCN: ABNORMAL
BENZODIAZ UR QL SCN: ABNORMAL
BZE UR QL SCN: ABNORMAL
CANNABINOIDS UR QL SCN: ABNORMAL
FENTANYL+NORFENTANYL UR QL SCN: ABNORMAL
METHADONE UR QL SCN: ABNORMAL
OPIATES UR QL SCN: ABNORMAL
OXYCODONE+OXYMORPHONE UR QL SCN: ABNORMAL
PCP UR QL SCN: ABNORMAL

## 2024-08-26 PROCEDURE — 80324 DRUG SCREEN AMPHETAMINES 1/2: CPT

## 2024-08-26 PROCEDURE — 80307 DRUG TEST PRSMV CHEM ANLYZR: CPT

## 2024-08-26 PROCEDURE — 99215 OFFICE O/P EST HI 40 MIN: CPT | Performed by: INTERNAL MEDICINE

## 2024-08-26 RX ORDER — DEXTROAMPHETAMINE SACCHARATE, AMPHETAMINE ASPARTATE, DEXTROAMPHETAMINE SULFATE AND AMPHETAMINE SULFATE 1.25; 1.25; 1.25; 1.25 MG/1; MG/1; MG/1; MG/1
TABLET ORAL
Qty: 90 TABLET | Refills: 0 | Status: SHIPPED | OUTPATIENT
Start: 2024-08-26

## 2024-08-26 NOTE — PATIENT INSTRUCTIONS
"     NAME: Veronica Moffett   DATE:  8/26/2024     Your Sleep Physician Today: Taty Peña MD  Your Primary Care Physician: No Assigned PCP Generic Provider, MD      Thank you for coming to the Sleep Medicine Clinic today!  Below is a summary of your treatment plan, other important information, and our contact numbers:    TREATMENT PLAN     Paper scripts for the Adderall XR to  tomorrow  Booster: 5mg up to three times a day.   Consider alternative meds  Sunosis- can send this to pharmacy    Consider: Sunosi  (medication that is helpful for narcolepsy and may be potentially helpful)  Bioavailability: ~95%  Half-life elimination: ~7.1 hours  Time to peak: 2 hours (fasting); delayed an additional 1 hour with high-fat meal  Excretion: Urine (95% as unchanged drug)    Follow-up Appointment:      OUR ADULT SLEEP MEDICINE TEAM- Getting in touch   Please do not hesitate to call the office or sleep nurse with any questions between appointments. BEST way to do this are via ways below:    Me and my office team, quick contact:  Call [phone] and leave a message. One of the administrative assistants will forward the message to my/sleep nurse through \"My Chart\" and/or email.   Have a clinical question?  Adult sleep nurse: Sharron Gonzales -[phone].    Call for clinical questions, medication updates, and concerns about prescriptions.   Email seep diaries and other documents at: [email or web address]  Or, send a message directly through \"My Chart\", which is the email service through your  Records Account: [email or web address]:// [email or web address].  This does not go directly to me but through another before it gets to me. So only use this if you have time to wait for a response.  If your issue is urgent, please call.   Have an appointment question?  Have an appointment concern, form faxed or other office issues?  Adult sleep : Gita Carter P: [phone]  F: [phone];       " "  IMPORTANT PHONE NUMBERS     Appointments (for Adult Sleep Clinic- general line): [phone] - option 2  Appointments (For Sleep Studies- general line): [phone] - option 3  Sleep Surgery: [phone]  ENT (Otolaryngology): [phone]  [employer] ([employer] [place]): [phone]    CONTACTING YOUR SLEEP MEDICINE DOCTOR- Getting in touch     Send a message directly to your provider through \"My Chart\", which is the email service through your  Records Account: [email or web address]   Call [phone] and leave a message. One of the administrative assistants will forward the message to your sleep medicine provider through \"My Chart\" and/or email.   Sleep nurse: For clinical questions, medication updates and refilling prescriptions: [phone]; or by email: [email or web address]    Please do not hesitate to reach out if you have pending questions.    Taty Peña MD   "

## 2024-08-26 NOTE — PROGRESS NOTES
Patient: Veronica Moffett   Patient info: [id number]  : 2000 -- AGE 24 y.o.    Clinician: Taty Peña MD   Location [facility]   Service Date: 2024          [facility]  Follow-up Visit Note    Virtual or Telephone Consent  An interactive audio and video telecommunication system which permits real time communications between the patient (at the originating site) and provider (at the distant site) was utilized to provide this telehealth service.   Verbal consent was requested and obtained from Veronica Moffett on this date, 24 for a telehealth visit.     HISTORY OF PRESENT ILLNESS     Veronica Moffett is a 24 y.o. female who presents to a [facility] for followup of NT1    PAST SLEEP HISTORY   Last seen: 2024  Summary of last visit: Ms. Moffett is a 23 y.o. female  returning to the  Sleep Medicine Clinic for follow-up of NT1 doing well on wake promoting agents but could be more functional. We talked about newer medications and potentially an oxybate which she is now going to consider (prior could not due to concern about middle of night attending to children).     Problem List and Plan/Orders  Problem List Items Addressed This Visit    None  Visit Diagnoses         Narcolepsy with cataplexy ([facility])    -  Primary     Relevant Orders     Drug Screen, Urine With Reflex to Confirmation     Narcolepsy and cataplexy ([facility])         Relevant Medications     amphetamine-dextroamphetamine XR (Adderall XR) 25 mg 24 hr capsule     amphetamine-dextroamphetamine XR (Adderall XR) 25 mg 24 hr capsule (Start on 2024)          Narcolepsy Type I (N-C) PSG-MSLT  (MSL 1.8 minutes; 2 SOREMPs - naps 1 and nap 4;. Prior to this, had cataplexy facies, and high suspicion despite the initial MSLT not meeting criteria.      Course: Responded best to Adderall 50mg ER in am (failed Vyvanse/Concerta in the past;  "Provigil- headaches, and did not work; tried melatonin and endorsed feeling depressed -\"brain did not like it\". Did OK with both pregnancies and postpartum while on meds. (decision was made to continue after risk/benefit discussion, did not wish to change the course.) Discussed Xyrem and modafinil in the past. Patient understands there is room for improvement, but not interested in adding or changing medications at this time.       Does not want to do twice nightly oxybate Xyrem/Xywav and also worried about not being able to wake up if her 1 year old needed her at night.      Does not mind doing something like once nightly but not willing to be off the Adderall     CGI:  3- somewhat improved      Recommendations/Plan/Management:  Continue Adderall ER 50mg (25mg x 2) daily  Monitor breathing; may need to repeat HSAT (last testing in 2020- was HSAT)   Medication adjustments:  consider Lumyrz, she is concerned about waking at night.  Kids are in her room; has issues with some behavioral of waking for her son.  She would like to trial to see if this something that could benefit her and still be able to wake up.   Trial period with 4.5g to start  Adequate sleep duration and appropriate timing     Diagnostics:  consider Future HSAT while on Lumyrz  Education:   about Lumyrz        INTERIM DATA REVIEW:  Personally reviewed any raw data such as interpretation report, data sheet, hypnogram, and titration table if available and applicable  Notes and encounters in interim    CURRENT HISTORY/CONCERNS  On today's visit, the patient reports having issues with her script- only concern.   Concerns:    needed refills.  Went to pharmacy. Told to come the next day. Then did not have it in stock. Then ordered  Cannot go without medication, and very hard without meds.   Would rather have paper scripts to go to different places    No time off work to play with meds    Boosters: recalls using these in high school. 5mg worked better than " 10mg    TROY:  No snoring    Daytime Symptoms  Same symptoms off meds. Cannot function well off meds, more naps etc.      Sleep-Wake Schedule:   Never feels she gets a normal amount of sleep.   But sleep is better than it used to be.   Did not do the Lumryz. Does not like the idea of doing meds.       REVIEW OF SYSTEMS   Review of Systems    ALLERGIES AND MEDICATIONS     Allergies   Allergen Reactions    Kiwi Hives       MEDICATIONS:     Current Outpatient Medications:     amphetamine-dextroamphetamine XR (Adderall XR) 25 mg 24 hr capsule, Take 2 capsules (50 mg) by mouth once daily in the morning. Do not crush or chew. Do not fill before July 10, 2024., Disp: 60 capsule, Rfl: 0    amphetamine-dextroamphetamine XR (Adderall XR) 25 mg 24 hr capsule, Take 2 capsules (50 mg) by mouth once daily in the morning. Do not crush or chew., Disp: 60 capsule, Rfl: 0    amphetamine-dextroamphetamine XR (Adderall XR) 25 mg 24 hr capsule, Take 2 capsules (50 mg) by mouth once daily in the morning for 15 days. Do not crush or chew., Disp: 30 capsule, Rfl: 0    Current Facility-Administered Medications:     medroxyPROGESTERone (Depo-Provera) injection 150 mg, 150 mg, intramuscular, Once, Mirna Mares, SANTIAGO-DANNI      HISTORIES   PAST SURGICAL HISTORY: She  has a past surgical history that includes Other surgical history (04/30/2019).   FAMILY HISTORY: No changes since previous visit. Otherwise non-contributory as charted.     SOCIAL HISTORY  Patient:  reports that she has never smoked. She has never used smokeless tobacco. She reports that she does not drink alcohol and does not use drugs.     PHYSICAL EXAM     VITAL SIGNS: There were no vitals taken for this visit. (Virtual)  PREVIOUS WEIGHTS:  Wt Readings from Last 3 Encounters:   02/26/24 112 kg (247 lb)   09/06/23 108 kg (238 lb)   03/22/23 108 kg (237 lb 3.4 oz)     EXAM:  General: Alert, attentive, in NAD  HEENT: Nares patent, oropharynx    Dentition/Jaw: appropriate  "dentition;    Neck: no visible masses  Lungs: Clear no cough  Extremities: warm, well perfused, no visible edema, normal joints,   Skin: no visible rash   Neurologic: face symmetric   Psychiatric: Affect appropriate      OTHER RESULTS/DATA     Lab Review  CBC:   Lab Results   Component Value Date    WBC 13.4 (H) 12/14/2021    HGB 11.2 (L) 12/14/2021    HCT 34.5 (L) 12/14/2021    MCV 92 12/14/2021     12/14/2021    TSH: No results found for: \"TSH\"; BMP:   Lab Results   Component Value Date    GLUCOSE 92 12/13/2021    CALCIUM 8.8 12/13/2021     12/13/2021    K 4.5 12/13/2021    CO2 21 12/13/2021     12/13/2021    BUN 6 12/13/2021    CREATININE 0.45 (L) 12/13/2021            ASSESSMENT/PLAN   Ms. Moffett is a 24 y.o. female  returning to the  Sleep Medicine Clinic for follow-up of Narcolepsy  Does not like the idea of doing an oxybate right now.     Problem List and Plan/Orders  Problem List Items Addressed This Visit       Narcolepsy with cataplexy ([facility]) - Primary    Overview     NT1  Course: Responded best to Adderall 50mg ER in am (failed Vyvanse/Concerta in the past; Provigil- headaches, and did not work; tried melatonin and endorsed feeling depressed -\"brain did not like it\"; Xyrem?) Did OK with both pregnancies and postpartum while on meds. (decision was made to continue after risk/benefit discussion, did not wish to change the course.)             Sleep-disordered breathing    Overview     sleep maintenance problems: may be narcolepsy vs. mild TROY PSG by 2017 (AHI 11), and has not had worsening of snoring; a Mild tonsillar hypertrophy (2+) and elevated BMI. (HST 2020 AHI 8). During pregnancy did not develop gestational DM/HTN and has lost significant weight postpartum.           CGI:  3- somewhat improved     Recommendations/Plan/Management:  Continue Adderall ER 50mg (25mg x 2) daily  She wants paper scripts so that she can go to other pharmacies when not available  Consider [facility] " pharmacy in future? (She would like paper scripts)   Plan to  tomorrow at main campus.   Adderall 5mg IR when needed. Up to TID (if out of the XR) or booster if needed.  Needs UDS  Will get today   Adequate sleep duration and appropriate timing  Sunosi may be tried if above fails- will send to her preferred pharmacy    Education: different meds  Followup: 6 months, provided contacts for interim care if needed.    MANAGEMENT OF CONTROLLED SUBSTANCES  - OOARS: I have personally reviewed the OARRS report for this patient. I have considered the risks of abuse, dependence, addiction and diversion; patient is taking the prescribed medications as prescribed, consistent with history. No unusual activity.  - CSA (Controlled Substance Agreement) YEARLY: done - will do at next in person visit.   - Education on controlled medications performed, along with refill policy  - Urine Drug Screen (UDS) yearly (if 18 and older):  due for 2024  Pain Management Panel          Latest Ref Rng & Units 3/22/2023   Pain Management Panel   Amphetamine Screen, Urine NEGATIVE PRESUMPTIVE POSITIVE    Barbiturate Screen, Urine NEGATIVE PRESUMPTIVE NEGATIVE    Fentanyl Screen, Urine NEGATIVE PRESUMPTIVE NEGATIVE    Methadone Screen, Urine NEGATIVE PRESUMPTIVE NEGATIVE       Details                  - Yearly in person followup      Time spent with patient with direct face to face contact and plan of care was 45 minutes with additional time for documentation, pre-charting and review of data as well as coordination of care.     Taty Peña MD

## 2024-08-27 RX ORDER — DEXTROAMPHETAMINE SACCHARATE, AMPHETAMINE ASPARTATE MONOHYDRATE, DEXTROAMPHETAMINE SULFATE AND AMPHETAMINE SULFATE 6.25; 6.25; 6.25; 6.25 MG/1; MG/1; MG/1; MG/1
50 CAPSULE, EXTENDED RELEASE ORAL EVERY MORNING
Qty: 60 CAPSULE | Refills: 0 | Status: SHIPPED | OUTPATIENT
Start: 2024-09-25 | End: 2024-10-25

## 2024-08-27 RX ORDER — DEXTROAMPHETAMINE SACCHARATE, AMPHETAMINE ASPARTATE MONOHYDRATE, DEXTROAMPHETAMINE SULFATE AND AMPHETAMINE SULFATE 6.25; 6.25; 6.25; 6.25 MG/1; MG/1; MG/1; MG/1
50 CAPSULE, EXTENDED RELEASE ORAL EVERY MORNING
Qty: 60 CAPSULE | Refills: 0 | Status: SHIPPED | OUTPATIENT
Start: 2024-10-25 | End: 2024-11-24

## 2024-08-27 RX ORDER — DEXTROAMPHETAMINE SACCHARATE, AMPHETAMINE ASPARTATE MONOHYDRATE, DEXTROAMPHETAMINE SULFATE AND AMPHETAMINE SULFATE 6.25; 6.25; 6.25; 6.25 MG/1; MG/1; MG/1; MG/1
50 CAPSULE, EXTENDED RELEASE ORAL EVERY MORNING
Qty: 60 CAPSULE | Refills: 0 | Status: SHIPPED | OUTPATIENT
Start: 2024-08-27 | End: 2024-09-26

## 2024-08-30 LAB
AMPHET UR-MCNC: >5000 NG/ML
MDA UR-MCNC: <200 NG/ML
MDEA UR-MCNC: <200 NG/ML
MDMA UR-MCNC: <200 NG/ML
METHAMPHET UR-MCNC: <200 NG/ML
PHENTERMINE UR CFM-MCNC: <200 NG/ML

## 2024-09-09 DIAGNOSIS — G47.411 NARCOLEPSY AND CATAPLEXY (HHS-HCC): ICD-10-CM

## 2024-09-09 RX ORDER — DEXTROAMPHETAMINE SACCHARATE, AMPHETAMINE ASPARTATE MONOHYDRATE, DEXTROAMPHETAMINE SULFATE AND AMPHETAMINE SULFATE 6.25; 6.25; 6.25; 6.25 MG/1; MG/1; MG/1; MG/1
50 CAPSULE, EXTENDED RELEASE ORAL EVERY MORNING
Qty: 60 CAPSULE | Refills: 0 | Status: SHIPPED | OUTPATIENT
Start: 2024-09-09 | End: 2024-10-10

## 2024-09-09 NOTE — TELEPHONE ENCOUNTER
Patient called to let Dr. Peña know that Sunosi 150 mg once daily is not working at all for her and she would like to continue on her Adderall ER 25mg 2 capsules by mouth every morning.    Patient stated that the [facility] are out-of-stock of Adderall ER 25 mg and being out of stock has been an ongoing issue the past few months.  Patient is unsure who would have the prescription.

## 2024-09-09 NOTE — TELEPHONE ENCOUNTER
Contacted several [facility] pharmacies in the area surrounding [facility] and they all say they are out of stock of Adderall ER capsules in all strength.    Contacted [facility] and they confirmed that they do have Adderall ER 25 mg in stock. Called patient and she is agreeable for prescription to be sent to [facility].

## 2024-09-10 ENCOUNTER — PHARMACY VISIT (OUTPATIENT)
Dept: PHARMACY | Facility: CLINIC | Age: 24
End: 2024-09-10
Payer: COMMERCIAL

## 2024-09-10 PROCEDURE — RXMED WILLOW AMBULATORY MEDICATION CHARGE

## 2024-10-08 ENCOUNTER — PHARMACY VISIT (OUTPATIENT)
Dept: PHARMACY | Facility: CLINIC | Age: 24
End: 2024-10-08
Payer: COMMERCIAL

## 2024-10-08 DIAGNOSIS — G47.411 NARCOLEPSY AND CATAPLEXY: ICD-10-CM

## 2024-10-08 PROCEDURE — RXMED WILLOW AMBULATORY MEDICATION CHARGE

## 2024-10-08 RX ORDER — DEXTROAMPHETAMINE SACCHARATE, AMPHETAMINE ASPARTATE MONOHYDRATE, DEXTROAMPHETAMINE SULFATE AND AMPHETAMINE SULFATE 6.25; 6.25; 6.25; 6.25 MG/1; MG/1; MG/1; MG/1
50 CAPSULE, EXTENDED RELEASE ORAL EVERY MORNING
Qty: 60 CAPSULE | Refills: 0 | Status: SHIPPED | OUTPATIENT
Start: 2024-10-08 | End: 2024-11-07

## 2024-10-08 NOTE — TELEPHONE ENCOUNTER
Patient requesting refill of Adderall XR 25 mg #60 per [facility] have the medication in-stock.    This RN checked OARRS and it is consist with prescribed medications. Patient has been filling Rx appropriately. Prescription request sent to Dr. Peña to review.

## 2024-11-08 DIAGNOSIS — G47.411 NARCOLEPSY WITH CATAPLEXY (HHS-HCC): ICD-10-CM

## 2024-11-08 DIAGNOSIS — G47.411 NARCOLEPSY AND CATAPLEXY: ICD-10-CM

## 2024-11-08 PROCEDURE — RXMED WILLOW AMBULATORY MEDICATION CHARGE

## 2024-11-08 RX ORDER — DEXTROAMPHETAMINE SACCHARATE, AMPHETAMINE ASPARTATE MONOHYDRATE, DEXTROAMPHETAMINE SULFATE AND AMPHETAMINE SULFATE 6.25; 6.25; 6.25; 6.25 MG/1; MG/1; MG/1; MG/1
50 CAPSULE, EXTENDED RELEASE ORAL EVERY MORNING
Qty: 60 CAPSULE | Refills: 0 | Status: SHIPPED | OUTPATIENT
Start: 2025-01-03 | End: 2025-02-02

## 2024-11-08 RX ORDER — DEXTROAMPHETAMINE SACCHARATE, AMPHETAMINE ASPARTATE MONOHYDRATE, DEXTROAMPHETAMINE SULFATE AND AMPHETAMINE SULFATE 6.25; 6.25; 6.25; 6.25 MG/1; MG/1; MG/1; MG/1
50 CAPSULE, EXTENDED RELEASE ORAL EVERY MORNING
Qty: 60 CAPSULE | Refills: 0 | Status: SHIPPED | OUTPATIENT
Start: 2024-12-06 | End: 2025-01-05

## 2024-11-08 RX ORDER — DEXTROAMPHETAMINE SACCHARATE, AMPHETAMINE ASPARTATE MONOHYDRATE, DEXTROAMPHETAMINE SULFATE AND AMPHETAMINE SULFATE 6.25; 6.25; 6.25; 6.25 MG/1; MG/1; MG/1; MG/1
50 CAPSULE, EXTENDED RELEASE ORAL EVERY MORNING
Qty: 60 CAPSULE | Refills: 0 | Status: SHIPPED | OUTPATIENT
Start: 2024-11-08 | End: 2024-12-09

## 2024-11-08 NOTE — TELEPHONE ENCOUNTER
Veronica Moffett<[email or web address]>    Adult Sleep Nurse    Notice - This message is from an external sender  This message came from outside of . Careful opening links or attachments.  Report Suspicious  Good morning,             Please send my prescriptions from now on to [facility] located at [address] [facility], [place] [address]. My prescription is Amphetamine Salts 25 MG, 2 capsules daily. I need it sent over today because I only have one day left. If you have any questions please call me.    Thank you,  Veronica Moffett  [phone]

## 2024-11-08 NOTE — TELEPHONE ENCOUNTER
This RN checked OARRS and it is consist with prescribed medications. Patient has been filling Rx appropriately. Prescription request sent to provider Mariana to review.

## 2024-11-09 ENCOUNTER — PHARMACY VISIT (OUTPATIENT)
Dept: PHARMACY | Facility: CLINIC | Age: 24
End: 2024-11-09
Payer: COMMERCIAL

## 2024-12-06 PROCEDURE — RXMED WILLOW AMBULATORY MEDICATION CHARGE

## 2024-12-09 ENCOUNTER — PHARMACY VISIT (OUTPATIENT)
Dept: PHARMACY | Facility: CLINIC | Age: 24
End: 2024-12-09
Payer: COMMERCIAL

## 2025-01-06 PROCEDURE — RXMED WILLOW AMBULATORY MEDICATION CHARGE

## 2025-01-07 ENCOUNTER — PHARMACY VISIT (OUTPATIENT)
Dept: PHARMACY | Facility: CLINIC | Age: 25
End: 2025-01-07
Payer: COMMERCIAL

## 2025-02-10 ENCOUNTER — PHARMACY VISIT (OUTPATIENT)
Dept: PHARMACY | Facility: CLINIC | Age: 25
End: 2025-02-10
Payer: COMMERCIAL

## 2025-02-10 DIAGNOSIS — G47.411 NARCOLEPSY AND CATAPLEXY: ICD-10-CM

## 2025-02-10 DIAGNOSIS — G47.411 NARCOLEPSY WITH CATAPLEXY (HHS-HCC): ICD-10-CM

## 2025-02-10 PROCEDURE — RXMED WILLOW AMBULATORY MEDICATION CHARGE

## 2025-02-10 RX ORDER — DEXTROAMPHETAMINE SACCHARATE, AMPHETAMINE ASPARTATE MONOHYDRATE, DEXTROAMPHETAMINE SULFATE AND AMPHETAMINE SULFATE 6.25; 6.25; 6.25; 6.25 MG/1; MG/1; MG/1; MG/1
50 CAPSULE, EXTENDED RELEASE ORAL EVERY MORNING
Qty: 60 CAPSULE | Refills: 0 | Status: SHIPPED | OUTPATIENT
Start: 2025-02-10 | End: 2025-03-12

## 2025-02-10 RX ORDER — DEXTROAMPHETAMINE SACCHARATE, AMPHETAMINE ASPARTATE MONOHYDRATE, DEXTROAMPHETAMINE SULFATE AND AMPHETAMINE SULFATE 6.25; 6.25; 6.25; 6.25 MG/1; MG/1; MG/1; MG/1
50 CAPSULE, EXTENDED RELEASE ORAL EVERY MORNING
Qty: 60 CAPSULE | Refills: 0 | Status: SHIPPED | OUTPATIENT
Start: 2025-04-08 | End: 2025-05-08

## 2025-02-10 RX ORDER — DEXTROAMPHETAMINE SACCHARATE, AMPHETAMINE ASPARTATE MONOHYDRATE, DEXTROAMPHETAMINE SULFATE AND AMPHETAMINE SULFATE 6.25; 6.25; 6.25; 6.25 MG/1; MG/1; MG/1; MG/1
50 CAPSULE, EXTENDED RELEASE ORAL EVERY MORNING
Qty: 60 CAPSULE | Refills: 0 | Status: SHIPPED | OUTPATIENT
Start: 2025-03-10 | End: 2025-04-09

## 2025-03-12 PROCEDURE — RXMED WILLOW AMBULATORY MEDICATION CHARGE

## 2025-03-13 ENCOUNTER — PHARMACY VISIT (OUTPATIENT)
Dept: PHARMACY | Facility: CLINIC | Age: 25
End: 2025-03-13
Payer: COMMERCIAL

## 2025-04-11 PROCEDURE — RXMED WILLOW AMBULATORY MEDICATION CHARGE

## 2025-04-12 ENCOUNTER — PHARMACY VISIT (OUTPATIENT)
Dept: PHARMACY | Facility: CLINIC | Age: 25
End: 2025-04-12
Payer: COMMERCIAL

## 2025-05-12 ENCOUNTER — PHARMACY VISIT (OUTPATIENT)
Dept: PHARMACY | Facility: CLINIC | Age: 25
End: 2025-05-12
Payer: COMMERCIAL

## 2025-05-12 ENCOUNTER — OFFICE VISIT (OUTPATIENT)
Dept: SLEEP MEDICINE | Facility: CLINIC | Age: 25
End: 2025-05-12
Payer: COMMERCIAL

## 2025-05-12 VITALS
BODY MASS INDEX: 42 KG/M2 | OXYGEN SATURATION: 96 % | HEART RATE: 96 BPM | SYSTOLIC BLOOD PRESSURE: 120 MMHG | WEIGHT: 246 LBS | HEIGHT: 64 IN | DIASTOLIC BLOOD PRESSURE: 76 MMHG

## 2025-05-12 DIAGNOSIS — G47.411 NARCOLEPSY WITH CATAPLEXY (HHS-HCC): Primary | ICD-10-CM

## 2025-05-12 DIAGNOSIS — G47.30 SLEEP-DISORDERED BREATHING: ICD-10-CM

## 2025-05-12 DIAGNOSIS — G47.411 NARCOLEPSY AND CATAPLEXY: ICD-10-CM

## 2025-05-12 PROCEDURE — RXMED WILLOW AMBULATORY MEDICATION CHARGE

## 2025-05-12 PROCEDURE — 99214 OFFICE O/P EST MOD 30 MIN: CPT | Performed by: INTERNAL MEDICINE

## 2025-05-12 RX ORDER — DEXTROAMPHETAMINE SACCHARATE, AMPHETAMINE ASPARTATE MONOHYDRATE, DEXTROAMPHETAMINE SULFATE AND AMPHETAMINE SULFATE 6.25; 6.25; 6.25; 6.25 MG/1; MG/1; MG/1; MG/1
50 CAPSULE, EXTENDED RELEASE ORAL EVERY MORNING
Qty: 60 CAPSULE | Refills: 0 | Status: SHIPPED | OUTPATIENT
Start: 2025-05-12 | End: 2025-06-11

## 2025-05-12 ASSESSMENT — ENCOUNTER SYMPTOMS
OCCASIONAL FEELINGS OF UNSTEADINESS: 0
LOSS OF SENSATION IN FEET: 0
DEPRESSION: 0

## 2025-05-12 ASSESSMENT — PATIENT HEALTH QUESTIONNAIRE - PHQ9
1. LITTLE INTEREST OR PLEASURE IN DOING THINGS: NOT AT ALL
SUM OF ALL RESPONSES TO PHQ9 QUESTIONS 1 AND 2: 0
2. FEELING DOWN, DEPRESSED OR HOPELESS: NOT AT ALL

## 2025-05-12 ASSESSMENT — COLUMBIA-SUICIDE SEVERITY RATING SCALE - C-SSRS
6. HAVE YOU EVER DONE ANYTHING, STARTED TO DO ANYTHING, OR PREPARED TO DO ANYTHING TO END YOUR LIFE?: NO
1. IN THE PAST MONTH, HAVE YOU WISHED YOU WERE DEAD OR WISHED YOU COULD GO TO SLEEP AND NOT WAKE UP?: NO
2. HAVE YOU ACTUALLY HAD ANY THOUGHTS OF KILLING YOURSELF?: NO

## 2025-05-12 ASSESSMENT — PAIN SCALES - GENERAL: PAINLEVEL_OUTOF10: 0-NO PAIN

## 2025-05-12 NOTE — PATIENT INSTRUCTIONS
"     NAME: Veronica Moffett   DATE:  5/12/2025     Your Sleep Physician Today: Taty Peña MD  Your Primary Care Physician: No Assigned PCP Generic Provider, MD      Thank you for coming to the Sleep Medicine Clinic today!  Below is a summary of your treatment plan, other important information, and our contact numbers:    TREATMENT PLAN     Given that you are mostly concerned about your night time sleep, we can consider oxybates-- can consider this in the future.   For natural remedies, consider consultation with the [facility] [email or web address]       Follow-up Appointment:      CONTACTING YOUR SLEEP MEDICINE DOCTOR- Getting in touch     Adult sleep office phone: [phone] (Ariella Cabral)  Sleep nurse support and medication follow-up: Sharron Gonzales RN  [phone] or [email or web address]  Send a message through \"My Chart\",  https:// [email or web address].    NOTE: [facility] message do not go directly to me, so there can be a delayed response.  If your issue is urgent, please call.      IMPORTANT PHONE NUMBERS     Sleep Phone [facility]: [phone] ([phone])   for sleep testing (option 1) and sleep clinic appointments/offices (option 2)  Appointments (main scheduling lines):  [phone]  Other important offices:   ENT Sleep Surgery: [phone] or general ENT/Otolaryngology: [phone]  [facility] ([employer]): [phone]    Labs: NOTE: that  started to use [facility] for labs you need to ensure that [facility] is a part of your insurance coverage. You can use this website if you need it: [email or web address]      Please do not hesitate to reach out if you have pending questions.    Taty Peña MD         The treatment of narcolepsy and idiopathic hypersomnia with an \"oxybate\" is a standard practice. These are medications that are taken at bedtime only.  They work to help treat the sleep in people with " "hypersomnia disorders.  When taken, people tend to feel better in the morning, wake up better and more refreshed, and have less daytime sleepiness.  This medication also helps cataplexy in people with narcolepsy.     There are many forms of oxybates in the market  Sodium oxybate (generic or Xyrem) is taken 2x nightly and has been the oldest available oxybate in the market for many years.  It still works great for the right person  Xywav is a lower sodium oxybate, also taken 2x nightly for people with narcoelpsy (there is an option to take this once nightly for people with idiopathic hypersomnia). It is newer than Xyrem and made by the same company.   Sodium oxybate extended release (Lumyrz) is the only once nightly medication available for narcolepsy.            STEPS BEFORE USING AN OXYBATE:  Oxybates have to be prescribed through an enrollment program to ensure safety, because the active ingredient has the potential for misuse.  These programs are the \"REMS\" program. You will have to enroll with this program and we guide the process.   Ensure you have a safe place for the medication to be stored. The medication is delivered to the home.    Understand how to take the medication. The dosage is specific to each person.  Taking the prescribed dosage at the right time is key. Each medication needs to be mixed with water, prepared at bedtime and then only taken when ready to go to sleep.  People can leave the mixed liquid safely as they prepare for bed. Then, they bring it to the bedside, get into the bed, and take the medication.  You don't want to leave the bed after the medication.  Keep out of the reach of children.   Other paperwork: there may be other paperwork needed such as pharmacy support (to help obtain the medication (e.g. [employer]), insurance support (e.g. [employer] program with Lumyrz) to help get the medication.     PRESCRIBING INFORMATION    Dosage:   Generally, we want to use the lowest possible dosage " "that helps the most.  Some side effects are dose-dependent (such as bedwetting), so if this happens, sometimes we can lower the dosage to help.  We start low and go up slowly to effect.     Dosages are per night: 3g (pediatric) 4.5 g, 6 g, 7.5g, 9g    Timing:  varies depending on formulation. Examples of a 6 gram dosing for each type of oxybate medication is as follows.  -  For Xyrem/Xywav: 6g would be taken at 3g per dosage 2x nightly; first dosage of 3g is taken in the mixed liquid after getting into bed and being ready to fall asleep. The 2nd dosage is set about 2-4 hours after the initial dosage and an alarm is used to wake up to take the 2nd dosage.   -  For Lumyrz, the mixed liquid of the entire 6g is taken in one dosage at bedtime.       SIDE EFFECTS     This is not an entire listing. The most common side effect is gastrointestinal (nausea, other  GI/\"stomach\" problems). Bedwetting can occur at higher dosages. Sometimes this improves and other times we need to reduce the dosage. Watch for breathing issues with medication, sleepwalking, or other unusual changes. Some rarer things to watch for are mood changes.     HOW TO MAKE IT TASTE BETTER    The oxybates are a liquid and sometimes not so great to taste. The standard is to mix with water. But, you can safely use Serafin aid (tropical flavor packet without the sugar) and unsweeted lemonade. Some people get used to the taste and other people find ways around it .   "

## 2025-05-12 NOTE — PROGRESS NOTES
"     Patient: Veronica Moffett   Patient info: [id number]  : 2000 -- AGE 24 y.o.    Clinician: Taty Peña MD   Location [facility]   Service Date: 2025          [facility]  Follow-up Visit Note    HISTORY OF PRESENT ILLNESS     Veronica Moffett is a 24 y.o. female who presents to a [facility] for followup of narcolepsy    PAST SLEEP HISTORY   Last seen: 2024  Summary of last visit:   ASSESSMENT/PLAN   Ms. Moffett is a 24 y.o. female  returning to the  Sleep Medicine Clinic for follow-up of Narcolepsy  Does not like the idea of doing an oxybate right now.      Problem List and Plan/Orders  Problem List Items Addressed This Visit         Narcolepsy with cataplexy ([facility]) - Primary     Overview       NT1  Course: Responded best to Adderall 50mg ER in am (failed Vyvanse/Concerta in the past; Provigil- headaches, and did not work; tried melatonin and endorsed feeling depressed -\"brain did not like it\"; Xyrem?) Did OK with both pregnancies and postpartum while on meds. (decision was made to continue after risk/benefit discussion, did not wish to change the course.)             Sleep-disordered breathing     Overview       sleep maintenance problems: may be narcolepsy vs. mild TROY PSG by 2017 (AHI 11), and has not had worsening of snoring; a Mild tonsillar hypertrophy (2+) and elevated BMI. (HST  AHI 8). During pregnancy did not develop gestational DM/HTN and has lost significant weight postpartum.             CGI:  3- somewhat improved      Recommendations/Plan/Management:  Continue Adderall ER 50mg (25mg x 2) daily  She wants paper scripts so that she can go to other pharmacies when not available  Consider [facility] pharmacy in future? (She would like paper scripts)   Plan to  tomorrow at [facility].   Adderall 5mg IR when needed. Up to TID (if out of the XR) or booster if needed.  Needs " "UDS  Will get today   Adequate sleep duration and appropriate timing  Sunosi may be tried if above fails- will send to her preferred pharmacy     Education: different meds  Followup: 6 months, provided contacts for interim care if needed.     MANAGEMENT OF CONTROLLED SUBSTANCES  - OOARS: I have personally reviewed the OARRS report for this patient. I have considered the risks of abuse, dependence, addiction and diversion; patient is taking the prescribed medications as prescribed, consistent with history. No unusual activity.  - CSA (Controlled Substance Agreement) YEARLY: done - will do at next in person visit.   - Education on controlled medications performed, along with refill policy  - Urine Drug Screen (UDS) yearly (if 18 and older):  due for 2024  Pain Management Panel              Latest Ref Rng & Units 3/22/2023   Pain Management Panel   Amphetamine Screen, Urine NEGATIVE PRESUMPTIVE POSITIVE    Barbiturate Screen, Urine NEGATIVE PRESUMPTIVE NEGATIVE    Fentanyl Screen, Urine NEGATIVE PRESUMPTIVE NEGATIVE    Methadone Screen, Urine NEGATIVE PRESUMPTIVE NEGATIVE         Details                       - Yearly in person followup          INTERIM DATA REVIEW:  Personally reviewed any raw data such as interpretation report, data sheet, hypnogram, and titration table if available and applicable  Notes and encounters in interim    CURRENT HISTORY/CONCERNS    On today's visit, the patient reports coming in for refills. Has had some barriers for coming in to visits.   Lives with mom, dad, 2 children, grandmother, and sister   Doing well otherwise  No concerns about medications    Meds:  Adderall 25mg x 2 in am  Not using much of the IR adderall   SCALES:   ESS: 11 /24  KIET 18 /28  FOSQ 21 /36     NT1:  Denies cataplexy, rarely lately; mostly with very significant sleepiness times (last time years ago)  No hallucinations  No sleep paralysis.     Denies apnea,  has been told she has a \"little\" snoring sometimes, but " nothing concerning  Other snorers in the home.   No breathing or gasping she has noted     NO RLS    Sleep-Wake Schedule:   Night meds prior to sleep: none   Bedtime:/sleep latency/Wake time: 9pm-7am  Weekends: same   Awakenings: awakenings as above. At least 2 x , no reason; no major nocturia   Total nocturnal sleep duration: 8-9 or more hours    REVIEW OF SYSTEMS   Review of Systems  No other current issues.     ALLERGIES AND MEDICATIONS   Allergies[1]    MEDICATIONS:   Current Medications[2]      HISTORIES   PAST MEDICAL HISTORY : She  has a past medical history of 30 weeks gestation of pregnancy ([facility]) (01/15/2019), 38 weeks gestation of pregnancy ([facility]) (03/28/2019), Acute vaginitis (06/14/2021), Anemia complicating pregnancy, unspecified trimester ([facility]) (03/12/2019), Body mass index (BMI) pediatric, greater than or equal to 95th percentile for age (11/08/2017), Body mass index (BMI)40.0-44.9, adult (05/25/2021), Elevated blood-pressure reading, without diagnosis of hypertension (06/22/2021), Encounter for examination of blood pressure without abnormal findings (04/03/2019), Encounter for follow-up examination after completed treatment for conditions other than malignant neoplasm (04/15/2019), Encounter for gynecological examination (general) (routine) without abnormal findings, Encounter for immunization (01/06/2016), Encounter for immunization (12/02/2015), Encounter for pregnancy test, result positive ([facility]) (06/22/2021), Encounter for supervision of normal pregnancy, unspecified, first trimester ([facility]), Encounter for supervision of normal pregnancy, unspecified, first trimester ([facility]) (05/04/2021), Gestational (pregnancy-induced) hypertension without significant proteinuria, third trimester ([facility]) (04/08/2019), Hypersomnia, unspecified (10/13/2014), Idiopathic hypersomnia with long sleep time (08/25/2017), Irregular menstruation, unspecified (04/27/2021), Maternal care for other  (suspected) fetal abnormality and damage, fetal cardiac anomalies, not applicable or unspecified ([facility]) (2019), Maternal care for other (suspected) fetal abnormality and damage, fetal cardiac anomalies, not applicable or unspecified ([facility]) (2019), Nasal congestion (10/12/2015), Other general symptoms and signs (2017), Other problems related to lifestyle, Other specified disorders of thyroid (2015), Other specified pregnancy related conditions, unspecified trimester ([facility]) (2019), Personal history of other diseases of the female genital tract (01/15/2019), Personal history of other diseases of the nervous system and sense organs (2015), Personal history of other diseases of the respiratory system (2016), Personal history of other diseases of the respiratory system (2017), Personal history of other diseases of the respiratory system, Personal history of other drug therapy (01/15/2019), Personal history of other endocrine, nutritional and metabolic disease (2019), Personal history of other infectious and parasitic diseases (2019), Personal history of other specified conditions (2017), Personal history of other specified conditions (2016), Sleep apnea, unspecified (2019), Streptococcal pharyngitis (2018), Streptococcal pharyngitis (2018), Supervision of high risk pregnancy, unspecified, third trimester ([facility]) (2019), Supervision of pregnancy with insufficient  care, unspecified trimester ([facility]) (2019), Unspecified abnormal findings in urine (10/13/2014), Unspecified disorder of eye and adnexa (2016), Vitamin D deficiency, unspecified (2015), and Vomiting of pregnancy, unspecified ([facility]) (2021).  Problem List[3]  PAST SURGICAL HISTORY: She  has a past surgical history that includes Other surgical history (2019).   FAMILY HISTORY: No changes since previous visit.  "Otherwise non-contributory as charted.     SOCIAL HISTORY  Patient:  reports that she has never smoked. She has never used smokeless tobacco. She reports that she does not drink alcohol and does not use drugs.     PHYSICAL EXAM     VITAL SIGNS: /76 (BP Location: Right arm, Patient Position: Sitting)   Pulse 96   Ht 1.626 m (5' 4\")   Wt 112 kg (246 lb)   SpO2 96%   BMI 42.23 kg/m²   PREVIOUS WEIGHTS:  Wt Readings from Last 3 Encounters:   04/11/24 114 kg (251 lb 15.8 oz)   02/26/24 112 kg (247 lb)   01/17/24 109 kg (240 lb 1.3 oz)     EXAM:  General: Alert, attentive, in NAD  HEENT: Nares patent   Dentition/Jaw: appropriate dentition;    Neck: no visible masses  Heart: RRR, no murmur  Lungs: Clear no cough  Extremities: warm, well perfused, no visible edema, normal joints,   Skin: no visible rash   Neurologic: face symmetric   Psychiatric: Affect appropriate      OTHER RESULTS/DATA     Lab Review   CBC:   Lab Results   Component Value Date    WBC 13.4 (H) 12/14/2021    HGB 11.2 (L) 12/14/2021    HCT 34.5 (L) 12/14/2021    MCV 92 12/14/2021     12/14/2021      Lab Results   Component Value Date    GLUCOSE 92 12/13/2021    CALCIUM 8.8 12/13/2021     12/13/2021    K 4.5 12/13/2021    CO2 21 12/13/2021     12/13/2021    BUN 6 12/13/2021    CREATININE 0.45 (L) 12/13/2021          ASSESSMENT/PLAN   Ms. Moffett is a 24 y.o. female  returning to the  Sleep Medicine Clinic for follow-up of NT and SDB; on medications that wake promote-- Adderall XR, and has been on this regimen for years.   During this visit and prior visits, we talked about considering new meds for narcolepsy and those on the horizon.  Answered questions she had on some additional meds.  Asked her to read about these and consider in the future. For now, she would like to stay the course. I think she could function better with a different regimen and encouraged her to consider these newer meds.     Melatonin was an usual side " "effect;  so she worries about any nocturnal medication -- but oxybates are very different. She could benefit from this in the future dez given nocturna sleep issues.       Problem List and Plan/Orders  Problem List Items Addressed This Visit       Narcolepsy with cataplexy ([facility]) - Primary    Overview   NT1  Course: Responded best to Adderall 50mg ER in am (failed Vyvanse/Concerta in the past; Provigil- headaches, and did not work; tried melatonin and endorsed feeling depressed -\"brain did not like it\"; Xyrem?) Did OK with both pregnancies and postpartum while on meds. (decision was made to continue after risk/benefit discussion, did not wish to change the course.)    Continue Adderall ER 50mg (25mg x 2) daily  Introduced Lumyrz           Sleep-disordered breathing    Overview   sleep maintenance problems: may be narcolepsy vs. mild TROY PSG by 2017 (AHI 11), and has not had worsening of snoring; a Mild tonsillar hypertrophy (2+) and elevated BMI. (HST 2020 AHI 8). During pregnancy did not develop gestational DM/HTN and has lost significant weight postpartum.           CGI:  2-much improved     Recommendations/Plan/Management:  Continue Adderall XR 50mg  Adequate sleep duration and appropriate timing  Consider new medication  Discussed Terri  Discussed oxybates  This is not a time for her to change she feels    Education:  many new narcolepsy options in the market; and newer ones coming in the future.   Followup: 6 months, provided contacts for interim care if needed.    Taty Peña MD    MANAGEMENT OF CONTROLLED SUBSTANCES  - OOARS: I have personally reviewed the OARRS report for this patient. I have considered the risks of abuse, dependence, addiction and diversion; patient is taking the prescribed medications as prescribed, consistent with history. No unusual activity.  - CSA (Controlled Substance Agreement) : electronic CSA done today.   - Education on controlled medications performed, along with refill " policy  - Urine Drug Screen (UDS) yearly (if 18 and older):  will be due in a few months (when she calls for refills)   Pain Management Panel  More data may exist         Latest Ref Rng & Units 8/26/2024 3/22/2023   Pain Management Panel   Amphetamine Screen, Urine Presumptive Negative Presumptive Positive  PRESUMPTIVE POSITIVE    Barbiturate Screen, Urine Presumptive Negative Presumptive Negative  PRESUMPTIVE NEGATIVE    Benzodiazepines Screen, Urine Presumptive Negative Presumptive Negative  -   Fentanyl Screen, Urine Presumptive Negative Presumptive Negative  PRESUMPTIVE NEGATIVE    Methadone Screen, Urine Presumptive Negative Presumptive Negative  PRESUMPTIVE NEGATIVE       - Yearly in person followup               [1]   Allergies  Allergen Reactions    Kiwi Hives   [2]   Current Outpatient Medications:     amphetamine-dextroamphetamine (Adderall) 5 mg tablet, Take one tablet po up to TID if needed, Disp: 90 tablet, Rfl: 0    amphetamine-dextroamphetamine XR (Adderall XR) 25 mg 24 hr capsule, Take 2 capsules (50 mg) by mouth once daily in the morning. Do not crush or chew. Do not fill before March 10, 2025., Disp: 60 capsule, Rfl: 0    amphetamine-dextroamphetamine XR (Adderall XR) 25 mg 24 hr capsule, Take 2 capsules (50 mg) by mouth once daily in the morning. Do not crush or chew. Do not fill before April 8, 2025., Disp: 60 capsule, Rfl: 0    amphetamine-dextroamphetamine XR (Adderall XR) 25 mg 24 hr capsule, Take 2 capsules (50 mg) by mouth once daily in the morning. Do not crush or chew., Disp: 60 capsule, Rfl: 0    solriamfetoL 150 mg tablet, Take half a pill po in am to start. If needed and tolerated, go to a full tablet. (Please use coupon card if not covered), Disp: 30 tablet, Rfl: 0    Current Facility-Administered Medications:     medroxyPROGESTERone (Depo-Provera) injection 150 mg, 150 mg, intramuscular, Once, SANTIAGO Velásquez-DANNI  [3]   Patient Active Problem List  Diagnosis     Narcolepsy with cataplexy ([facility])    Sleep-disordered breathing

## 2025-05-18 RX ORDER — DEXTROAMPHETAMINE SACCHARATE, AMPHETAMINE ASPARTATE MONOHYDRATE, DEXTROAMPHETAMINE SULFATE AND AMPHETAMINE SULFATE 6.25; 6.25; 6.25; 6.25 MG/1; MG/1; MG/1; MG/1
50 CAPSULE, EXTENDED RELEASE ORAL EVERY MORNING
Qty: 60 CAPSULE | Refills: 0 | Status: SHIPPED | OUTPATIENT
Start: 2025-06-09 | End: 2025-07-09

## 2025-05-18 RX ORDER — DEXTROAMPHETAMINE SACCHARATE, AMPHETAMINE ASPARTATE MONOHYDRATE, DEXTROAMPHETAMINE SULFATE AND AMPHETAMINE SULFATE 6.25; 6.25; 6.25; 6.25 MG/1; MG/1; MG/1; MG/1
50 CAPSULE, EXTENDED RELEASE ORAL EVERY MORNING
Qty: 60 CAPSULE | Refills: 0 | Status: SHIPPED | OUTPATIENT
Start: 2025-07-07 | End: 2025-08-06

## 2025-06-09 ENCOUNTER — TELEPHONE (OUTPATIENT)
Dept: PULMONOLOGY | Facility: HOSPITAL | Age: 25
End: 2025-06-09
Payer: COMMERCIAL

## 2025-06-09 NOTE — TELEPHONE ENCOUNTER
Called pt about appt change, did not receive an answer, a detailed message was with new date and time.

## 2025-06-11 ENCOUNTER — PHARMACY VISIT (OUTPATIENT)
Dept: PHARMACY | Facility: CLINIC | Age: 25
End: 2025-06-11
Payer: COMMERCIAL

## 2025-06-11 PROCEDURE — RXMED WILLOW AMBULATORY MEDICATION CHARGE

## 2025-07-09 ENCOUNTER — OFFICE VISIT (OUTPATIENT)
Dept: OBSTETRICS AND GYNECOLOGY | Facility: HOSPITAL | Age: 25
End: 2025-07-09
Payer: COMMERCIAL

## 2025-07-09 ENCOUNTER — LAB (OUTPATIENT)
Dept: LAB | Facility: HOSPITAL | Age: 25
End: 2025-07-09
Payer: COMMERCIAL

## 2025-07-09 VITALS
BODY MASS INDEX: 44.56 KG/M2 | HEIGHT: 64 IN | SYSTOLIC BLOOD PRESSURE: 130 MMHG | WEIGHT: 261 LBS | HEART RATE: 105 BPM | DIASTOLIC BLOOD PRESSURE: 84 MMHG

## 2025-07-09 DIAGNOSIS — Z34.80 ENCOUNTER FOR SUPERVISION OF OTHER NORMAL PREGNANCY, UNSPECIFIED TRIMESTER (HHS-HCC): Primary | ICD-10-CM

## 2025-07-09 DIAGNOSIS — Z33.1: ICD-10-CM

## 2025-07-09 DIAGNOSIS — Z01.419 WELL WOMAN EXAM WITH ROUTINE GYNECOLOGICAL EXAM: Primary | ICD-10-CM

## 2025-07-09 DIAGNOSIS — Z34.80 SUPERVISION OF OTHER NORMAL PREGNANCY, ANTEPARTUM (HHS-HCC): ICD-10-CM

## 2025-07-09 PROBLEM — B00.9 HERPES SIMPLEX VIRUS (HSV) INFECTION: Status: ACTIVE | Noted: 2025-07-09

## 2025-07-09 LAB
ABO GROUP (TYPE) IN BLOOD: NORMAL
ANTIBODY SCREEN: NORMAL
ERYTHROCYTE [DISTWIDTH] IN BLOOD BY AUTOMATED COUNT: 13.2 % (ref 11.5–14.5)
HCT VFR BLD AUTO: 40.3 % (ref 36–46)
HGB BLD-MCNC: 12.5 G/DL (ref 12–16)
MCH RBC QN AUTO: 28.6 PG (ref 26–34)
MCHC RBC AUTO-ENTMCNC: 31 G/DL (ref 32–36)
MCV RBC AUTO: 92 FL (ref 80–100)
NRBC BLD-RTO: 0 /100 WBCS (ref 0–0)
PLATELET # BLD AUTO: 243 X10*3/UL (ref 150–450)
PREGNANCY TEST URINE, POC: POSITIVE
RBC # BLD AUTO: 4.37 X10*6/UL (ref 4–5.2)
REFLEX ADDED, ANEMIA PANEL: NORMAL
RH FACTOR (ANTIGEN D): NORMAL
WBC # BLD AUTO: 9.9 X10*3/UL (ref 4.4–11.3)

## 2025-07-09 PROCEDURE — 83021 HEMOGLOBIN CHROMOTOGRAPHY: CPT

## 2025-07-09 PROCEDURE — 81025 URINE PREGNANCY TEST: CPT | Performed by: NURSE PRACTITIONER

## 2025-07-09 PROCEDURE — 87661 TRICHOMONAS VAGINALIS AMPLIF: CPT | Performed by: NURSE PRACTITIONER

## 2025-07-09 PROCEDURE — 86901 BLOOD TYPING SEROLOGIC RH(D): CPT

## 2025-07-09 PROCEDURE — 1036F TOBACCO NON-USER: CPT | Performed by: NURSE PRACTITIONER

## 2025-07-09 PROCEDURE — RXMED WILLOW AMBULATORY MEDICATION CHARGE

## 2025-07-09 PROCEDURE — 3008F BODY MASS INDEX DOCD: CPT | Performed by: NURSE PRACTITIONER

## 2025-07-09 PROCEDURE — 86900 BLOOD TYPING SEROLOGIC ABO: CPT

## 2025-07-09 PROCEDURE — 99385 PREV VISIT NEW AGE 18-39: CPT | Performed by: NURSE PRACTITIONER

## 2025-07-09 PROCEDURE — 85027 COMPLETE CBC AUTOMATED: CPT

## 2025-07-09 PROCEDURE — 36415 COLL VENOUS BLD VENIPUNCTURE: CPT

## 2025-07-09 PROCEDURE — 87491 CHLMYD TRACH DNA AMP PROBE: CPT | Performed by: NURSE PRACTITIONER

## 2025-07-09 PROCEDURE — 86850 RBC ANTIBODY SCREEN: CPT

## 2025-07-09 RX ORDER — NAPROXEN SODIUM 220 MG/1
81 TABLET, FILM COATED ORAL 2 TIMES DAILY
Qty: 60 TABLET | Refills: 11 | Status: SHIPPED | OUTPATIENT
Start: 2025-07-09 | End: 2026-07-09

## 2025-07-09 SDOH — ECONOMIC STABILITY: FOOD INSECURITY: WITHIN THE PAST 12 MONTHS, THE FOOD YOU BOUGHT JUST DIDN'T LAST AND YOU DIDN'T HAVE MONEY TO GET MORE.: NEVER TRUE

## 2025-07-09 SDOH — ECONOMIC STABILITY: FOOD INSECURITY: WITHIN THE PAST 12 MONTHS, YOU WORRIED THAT YOUR FOOD WOULD RUN OUT BEFORE YOU GOT MONEY TO BUY MORE.: NEVER TRUE

## 2025-07-09 SDOH — ECONOMIC STABILITY: INCOME INSECURITY: IN THE LAST 12 MONTHS, WAS THERE A TIME WHEN YOU WERE NOT ABLE TO PAY THE MORTGAGE OR RENT ON TIME?: NO

## 2025-07-09 SDOH — ECONOMIC STABILITY: TRANSPORTATION INSECURITY
IN THE PAST 12 MONTHS, HAS THE LACK OF TRANSPORTATION KEPT YOU FROM MEDICAL APPOINTMENTS OR FROM GETTING MEDICATIONS?: NO

## 2025-07-09 SDOH — ECONOMIC STABILITY: TRANSPORTATION INSECURITY
IN THE PAST 12 MONTHS, HAS LACK OF TRANSPORTATION KEPT YOU FROM MEETINGS, WORK, OR FROM GETTING THINGS NEEDED FOR DAILY LIVING?: NO

## 2025-07-09 ASSESSMENT — SOCIAL DETERMINANTS OF HEALTH (SDOH)
WITHIN THE LAST YEAR, HAVE YOU BEEN AFRAID OF YOUR PARTNER OR EX-PARTNER?: NO
WITHIN THE LAST YEAR, HAVE TO BEEN RAPED OR FORCED TO HAVE ANY KIND OF SEXUAL ACTIVITY BY YOUR PARTNER OR EX-PARTNER?: NO
HOW OFTEN DO YOU GET TOGETHER WITH FRIENDS OR RELATIVES?: MORE THAN THREE TIMES A WEEK
HOW OFTEN DO YOU ATTENT MEETINGS OF THE CLUB OR ORGANIZATION YOU BELONG TO?: NEVER
DO YOU BELONG TO ANY CLUBS OR ORGANIZATIONS SUCH AS CHURCH GROUPS UNIONS, FRATERNAL OR ATHLETIC GROUPS, OR SCHOOL GROUPS?: NO
IN A TYPICAL WEEK, HOW MANY TIMES DO YOU TALK ON THE PHONE WITH FAMILY, FRIENDS, OR NEIGHBORS?: MORE THAN THREE TIMES A WEEK
WITHIN THE LAST YEAR, HAVE YOU BEEN HUMILIATED OR EMOTIONALLY ABUSED IN OTHER WAYS BY YOUR PARTNER OR EX-PARTNER?: NO
WITHIN THE LAST YEAR, HAVE YOU BEEN KICKED, HIT, SLAPPED, OR OTHERWISE PHYSICALLY HURT BY YOUR PARTNER OR EX-PARTNER?: NO
HOW HARD IS IT FOR YOU TO PAY FOR THE VERY BASICS LIKE FOOD, HOUSING, MEDICAL CARE, AND HEATING?: NOT HARD AT ALL
ARE YOU MARRIED, WIDOWED, DIVORCED, SEPARATED, NEVER MARRIED, OR LIVING WITH A PARTNER?: NEVER MARRIED
HOW OFTEN DO YOU ATTEND CHURCH OR RELIGIOUS SERVICES?: MORE THAN 4 TIMES PER YEAR

## 2025-07-09 ASSESSMENT — ENCOUNTER SYMPTOMS: NAUSEA: 1

## 2025-07-09 ASSESSMENT — LIFESTYLE VARIABLES
SKIP TO QUESTIONS 9-10: 1
AUDIT-C TOTAL SCORE: 0
HOW OFTEN DO YOU HAVE SIX OR MORE DRINKS ON ONE OCCASION: NEVER
HOW MANY STANDARD DRINKS CONTAINING ALCOHOL DO YOU HAVE ON A TYPICAL DAY: PATIENT DOES NOT DRINK
HOW OFTEN DO YOU HAVE A DRINK CONTAINING ALCOHOL: NEVER

## 2025-07-09 ASSESSMENT — PATIENT HEALTH QUESTIONNAIRE - PHQ9
2. FEELING DOWN, DEPRESSED OR HOPELESS: NOT AT ALL
1. LITTLE INTEREST OR PLEASURE IN DOING THINGS: NOT AT ALL
SUM OF ALL RESPONSES TO PHQ9 QUESTIONS 1 & 2: 0

## 2025-07-09 ASSESSMENT — PAIN SCALES - GENERAL: PAINLEVEL_OUTOF10: 0-NO PAIN

## 2025-07-09 NOTE — PROGRESS NOTES
"Janell Zaman, APRN-CNP     Subjective   Veronica Moffett is a 25 y.o. female who presents for annual exam.   25-year-old female here for annual exam.  Reports last menstrual cycle  with an at home positive pregnancy test.  In office pregnancy test also positive.  Reports her menstrual cycles are irregular.    Chart review shows prior to deliveries by  section.  Medical history complicated by narcolepsy.  Medical History[1]  Surgical History[2]    OB History          2    Para   2    Term   2            AB        Living   2         SAB        IAB        Ectopic        Multiple        Live Births   2               Patient's last menstrual period was 2025 (exact date).      Review of Systems   Gastrointestinal:  Positive for nausea.   Genitourinary:  Positive for menstrual problem.   All other systems reviewed and are negative.    Breast: No Complaints   Vaginal: No Complaints        Objective   /84   Pulse 105   Ht 1.626 m (5' 4\")   Wt 118 kg (261 lb)   LMP 2025 (Exact Date)   BMI 44.80 kg/m²   Physical Exam  Constitutional:       Appearance: She is obese.   Genitourinary:      Right Labia: No rash or tenderness.     Left Labia: No tenderness or rash.     No vaginal discharge.        Right Adnexa: no mass present.     Left Adnexa: no mass present.     No cervical motion tenderness.      Uterus is enlarged.   Breasts:     Right: Normal.      Left: Normal.   HENT:      Head: Normocephalic.   Pulmonary:      Effort: Pulmonary effort is normal.   Abdominal:      Palpations: Abdomen is soft.   Musculoskeletal:      Cervical back: Normal range of motion.   Neurological:      Mental Status: She is alert.   Skin:     General: Skin is warm and dry.   Psychiatric:         Mood and Affect: Mood normal.                   Assessment/Plan   Problem List Items Addressed This Visit           ICD-10-CM    Incidental pregnancy confirmed ([facility]) Z33.1    Relevant Medications    " aspirin 81 mg chewable tablet    Other Relevant Orders    POCT pregnancy, urine manually resulted (Completed)     Other Visit Diagnoses         Codes      Well woman exam with routine gynecological exam    -  Primary Z01.419      Supervision of other normal pregnancy, antepartum ([facility])     Z34.80    Relevant Orders    CBC Anemia Panel With Reflex,Pregnancy    Hemoglobin A1C    Hemoglobin Identification with Path Review    Hepatitis B Core Antibody, Total    Hepatitis B surface Ag    Hepatitis B Surface Antibody    Hepatitis C Antibody    HIV 1/2 Antigen/Antibody Screen with Reflex to Confirmation    Rubella IgG    Syphilis Screen with Reflex    Type And Screen Is this order related to pregnancy or an upcoming surgery? Yes; Where will this surgery/delivery be performed? [facility]; What is the date of the surgery? 1/5/2026; Has this patient ever had a transfusion? Unknown; H...    Urine culture    THINPREP PAP TEST (25-30)    US MAC OB imaging order        Patient will get new OB labs drawn and ultrasound completed returning in 2 weeks at which time new OB visit will be completed.  Patient reports she has prenatal vitamins at home to begin taking and baby aspirin prescription sent to the pharmacy.       [1]   Past Medical History:  Diagnosis Date    30 weeks gestation of pregnancy ([facility]) 01/15/2019    30 weeks gestation of pregnancy    38 weeks gestation of pregnancy ([facility]) 03/28/2019    38 weeks gestation of pregnancy    Acute vaginitis 06/14/2021    BV (bacterial vaginosis)    Anemia complicating pregnancy, unspecified trimester 03/12/2019    Iron deficiency anemia of pregnancy    Body mass index (BMI) pediatric, greater than or equal to 95th percentile for age 11/08/2017    BMI (body mass index), pediatric, 95-99% for age    Body mass index (BMI)40.0-44.9, adult 05/25/2021    BMI 40.0-44.9, adult    Elevated blood-pressure reading, without diagnosis of hypertension 06/22/2021    Elevated  blood pressure reading    Encounter for examination of blood pressure without abnormal findings 04/03/2019    Encounter for blood pressure examination    Encounter for follow-up examination after completed treatment for conditions other than malignant neoplasm 04/15/2019    Postop check    Encounter for gynecological examination (general) (routine) without abnormal findings     Well woman exam with routine gynecological exam    Encounter for immunization 01/06/2016    Immunization due    Encounter for immunization 12/02/2015    Immunization due    Encounter for pregnancy test, result positive ([facility]) 06/22/2021    Positive urine pregnancy test    Encounter for supervision of normal pregnancy, unspecified, first trimester     Encounter for pregnancy related examination in first trimester    Encounter for supervision of normal pregnancy, unspecified, first trimester 05/04/2021    Pregnancy with uncertain dates in first trimester    Gestational (pregnancy-induced) hypertension without significant proteinuria, third trimester ([facility]) 04/08/2019    Gestational hypertension without significant proteinuria during pregnancy in third trimester, antepartum    Hypersomnia, unspecified 10/13/2014    Hypersomnia    Idiopathic hypersomnia with long sleep time 08/25/2017    Idiopathic hypersomnia with long sleep time    Irregular menstruation, unspecified 04/27/2021    Missed menses    Maternal care for other (suspected) fetal abnormality and damage, fetal cardiac anomalies, not applicable or unspecified 01/22/2019    Fetal ventricular septal defect affecting antepartum care of mother    Maternal care for other (suspected) fetal abnormality and damage, fetal cardiac anomalies, not applicable or unspecified 03/19/2019    Abnormal fetal echocardiogram affecting antepartum care of mother    Nasal congestion 10/12/2015    Nasal congestion    Other general symptoms and signs 02/23/2017    Flu-like symptoms    Other problems related  to lifestyle     Other problems related to lifestyle    Other specified disorders of thyroid 2015    Thyroid fullness    Other specified pregnancy related conditions, unspecified trimester ([facility]) 2019    Carpal tunnel syndrome during pregnancy    Personal history of other diseases of the female genital tract 01/15/2019    History of amenorrhea    Personal history of other diseases of the nervous system and sense organs 2015    History of acute conjunctivitis    Personal history of other diseases of the respiratory system 2016    History of acute pharyngitis    Personal history of other diseases of the respiratory system 2017    History of upper respiratory infection    Personal history of other diseases of the respiratory system     History of sore throat    Personal history of other drug therapy 01/15/2019    History of influenza vaccination    Personal history of other endocrine, nutritional and metabolic disease 2019    History of obesity    Personal history of other infectious and parasitic diseases 2019    History of herpes simplex infection    Personal history of other specified conditions 2017    History of dysuria    Personal history of other specified conditions 2016    History of insomnia    Sleep apnea, unspecified 2019    Sleep-disordered breathing    Streptococcal pharyngitis 2018    Strep pharyngitis    Streptococcal pharyngitis 2018    Pharyngitis due to group A beta hemolytic Streptococci    Supervision of high risk pregnancy, unspecified, third trimester ([facility]) 2019    High-risk pregnancy in third trimester    Supervision of pregnancy with insufficient  care, unspecified trimester 2019    Late prenatal care    Unspecified abnormal findings in urine 10/13/2014    Abnormal urine findings    Unspecified disorder of eye and adnexa 2016    Abnormal vision screen    Vitamin D deficiency, unspecified  2015    Vitamin D deficiency    Vomiting of pregnancy, unspecified 2021    Nausea and vomiting in pregnancy prior to 22 weeks gestation   [2]   Past Surgical History:  Procedure Laterality Date     SECTION, LOW TRANSVERSE  2021    OTHER SURGICAL HISTORY  2019     section

## 2025-07-10 LAB
C TRACH RRNA SPEC QL NAA+PROBE: NEGATIVE
HEMOGLOBIN A2: 2.7 % (ref 2–3.5)
HEMOGLOBIN A: 97 % (ref 95.8–98)
HEMOGLOBIN F: 0.3 % (ref 0–2)
HEMOGLOBIN IDENTIFICATION INTERPRETATION: NORMAL
N GONORRHOEA DNA SPEC QL PROBE+SIG AMP: NEGATIVE
PATH REVIEW-HGB IDENTIFICATION: NORMAL
T VAGINALIS RRNA SPEC QL NAA+PROBE: NEGATIVE

## 2025-07-10 PROCEDURE — RXMED WILLOW AMBULATORY MEDICATION CHARGE

## 2025-07-11 ENCOUNTER — PHARMACY VISIT (OUTPATIENT)
Dept: PHARMACY | Facility: CLINIC | Age: 25
End: 2025-07-11
Payer: COMMERCIAL

## 2025-07-11 LAB
BACTERIA UR CULT: NORMAL
EST. AVERAGE GLUCOSE BLD GHB EST-MCNC: 108 MG/DL
EST. AVERAGE GLUCOSE BLD GHB EST-SCNC: 6 MMOL/L
HBA1C MFR BLD: 5.4 %
HBV CORE AB SERPL QL IA: NORMAL
HBV SURFACE AB SERPL IA-ACNC: <5 MIU/ML
HBV SURFACE AG SERPL QL IA: NORMAL
HCV AB SERPL QL IA: NORMAL
HIV 1+2 AB+HIV1 P24 AG SERPL QL IA: NORMAL
HIV 1+2 AB+HIV1 P24 AG SERPL QL IA: NORMAL
RUBV IGG SERPL IA-ACNC: 6.64 INDEX
T PALLIDUM AB SER QL IA: NORMAL

## 2025-07-14 LAB
EST. AVERAGE GLUCOSE BLD GHB EST-MCNC: 108 MG/DL
EST. AVERAGE GLUCOSE BLD GHB EST-SCNC: 6 MMOL/L
HBA1C MFR BLD: 5.4 %
HBV CORE AB SERPL QL IA: NORMAL
HBV SURFACE AB SERPL IA-ACNC: <5 MIU/ML
HBV SURFACE AG SERPL QL IA: NORMAL
HCV AB SERPL QL IA: NORMAL
HIV 1+2 AB+HIV1 P24 AG SERPL QL IA: NORMAL
HIV 1+2 AB+HIV1 P24 AG SERPL QL IA: NORMAL
RUBV IGG SERPL IA-ACNC: 6.64 INDEX
T PALLIDUM AB SER QL IA: NEGATIVE

## 2025-07-25 ENCOUNTER — INITIAL PRENATAL (OUTPATIENT)
Dept: OBSTETRICS AND GYNECOLOGY | Facility: HOSPITAL | Age: 25
End: 2025-07-25
Payer: COMMERCIAL

## 2025-07-25 VITALS — BODY MASS INDEX: 45.66 KG/M2 | SYSTOLIC BLOOD PRESSURE: 124 MMHG | DIASTOLIC BLOOD PRESSURE: 83 MMHG | WEIGHT: 266 LBS

## 2025-07-25 DIAGNOSIS — Z33.1: ICD-10-CM

## 2025-07-25 DIAGNOSIS — B00.9 HERPES SIMPLEX VIRUS (HSV) INFECTION: ICD-10-CM

## 2025-07-25 DIAGNOSIS — Z3A.16 16 WEEKS GESTATION OF PREGNANCY (HHS-HCC): Primary | ICD-10-CM

## 2025-07-25 PROCEDURE — 0500F INITIAL PRENATAL CARE VISIT: CPT | Performed by: NURSE PRACTITIONER

## 2025-07-25 SDOH — ECONOMIC STABILITY: FOOD INSECURITY: WITHIN THE PAST 12 MONTHS, THE FOOD YOU BOUGHT JUST DIDN'T LAST AND YOU DIDN'T HAVE MONEY TO GET MORE.: NEVER TRUE

## 2025-07-25 SDOH — ECONOMIC STABILITY: FOOD INSECURITY: WITHIN THE PAST 12 MONTHS, YOU WORRIED THAT YOUR FOOD WOULD RUN OUT BEFORE YOU GOT MONEY TO BUY MORE.: NEVER TRUE

## 2025-07-25 ASSESSMENT — EDINBURGH POSTNATAL DEPRESSION SCALE (EPDS)
TOTAL SCORE: 0
I HAVE FELT SCARED OR PANICKY FOR NO GOOD REASON: NO, NOT AT ALL
I HAVE BLAMED MYSELF UNNECESSARILY WHEN THINGS WENT WRONG: NO, NEVER
THE THOUGHT OF HARMING MYSELF HAS OCCURRED TO ME: NEVER
I HAVE BEEN ANXIOUS OR WORRIED FOR NO GOOD REASON: NO, NOT AT ALL
I HAVE LOOKED FORWARD WITH ENJOYMENT TO THINGS: AS MUCH AS I EVER DID
I HAVE BEEN SO UNHAPPY THAT I HAVE BEEN CRYING: NO, NEVER
I HAVE FELT SAD OR MISERABLE: NO, NOT AT ALL
I HAVE BEEN SO UNHAPPY THAT I HAVE HAD DIFFICULTY SLEEPING: NOT AT ALL
I HAVE BEEN ABLE TO LAUGH AND SEE THE FUNNY SIDE OF THINGS: AS MUCH AS I ALWAYS COULD
THINGS HAVE BEEN GETTING ON TOP OF ME: NO, I HAVE BEEN COPING AS WELL AS EVER

## 2025-07-25 ASSESSMENT — LIFESTYLE VARIABLES
HOW OFTEN DO YOU HAVE A DRINK CONTAINING ALCOHOL: NEVER
HOW MANY STANDARD DRINKS CONTAINING ALCOHOL DO YOU HAVE ON A TYPICAL DAY: PATIENT DOES NOT DRINK
AUDIT-C TOTAL SCORE: 0
HOW OFTEN DO YOU HAVE SIX OR MORE DRINKS ON ONE OCCASION: NEVER
SKIP TO QUESTIONS 9-10: 1

## 2025-07-25 NOTE — PROGRESS NOTES
Ob Visit  25     SUBJECTIVE      HPI: Veronica Moffett is a 25 y.o.  at 16w3d here for new OB visit.  16 weeks gestational age by last menstrual cycle.  New OB labs reviewed.  Anatomy ultrasound ordered.  Reports she is taking prenatal vitamins and baby aspirin.    All questions answered.       OBJECTIVE  Visit Vitals  /83   Wt 121 kg (266 lb)   LMP 2025 (Exact Date)   BMI 45.66 kg/m²   OB Status Pregnant   Smoking Status Never   BSA 2.34 m²            ASSESSMENT & PLAN    Veronica Moffett is a 25 y.o.  at 16w3d here for the following concerns we addressed today:    Problem List Items Addressed This Visit          Pregnancy    16 weeks gestation of pregnancy ([facility]) - Primary    Overview   Desired provider in labor: [] CNM  [] Physician   [] Either Acceptable  [] Blood Products: [x] Yes, accepts [] No, needs counseling  [x] Initial BMI: Could not be calculated   [x] Prenatal Labs:   [] Cervical Cancer Screening up to date pending  [x] Rh status: Full positive  [] Screen for IPV and Substance Use Risk:  [] Genetic Screening (cfDNA):    [] First Trimester Anatomy Screen (11-13.6 wks):  [x] Baby ASA (initiated):  [] Pregnancy dated by:     [] Anatomy US: (19-20 wks)  [] Federal Sterilization consent signed (if indicated):  [] 1hr GCT at 24-28wks:  [] Rhogam (if indicated):   [] Fetal Surveillance (if indicated):  [] Tdap (27-32 wks, may be given up to 36 wks if initial window missed):   [] RSV (32-36 wks) (Sept. to end of ):     [] Feeding Intentions:  [] Postpartum Birth control method:   [] GBS at 36 - 37 wks:  [] 39 weeks discussion of IOL vs. Expectant management:  [] Mode of delivery ( anticipated ):          Relevant Orders    US MAC OB imaging order       Other    Delivery by  section using transverse incision of lower segment of uterus ([facility])    Overview   Anticipate delivery by repeat  section         Herpes simplex virus (HSV) infection    Overview   Begin  prophylactic treatment 35 to 36 weeks              RTC in 4 weeks      Janell Zaman, APRN-CNP

## 2025-07-25 NOTE — LETTER
7/25/2025    To Whom It May Concern:    This is to certify that my patient,Veronica Moffett is under my care for her pregnancy.    The following guidelines may be used for any dental work:  You may use a local anesthetic with or without Epinephrine.  You may prescribe any Penicillin or Cephalosporin if an antibiotic is necessary. (Dependent on allergy history)  You may take x-rays with a lead apron if necessary.  You may prescribe Tylenol and/or narcotics for pain.  You may extract teeth if necessary.    If you need further information or if you have any concerns, please contact our office.    Sincerely,        SANTIAGO Baker-CNP  [facility]

## 2025-07-25 NOTE — LETTER
7/25/2025    To Whom It May Concern:    Veronica Moffett is being followed for her pregnancy at [facility].  Estimated Date of Delivery: 1/6/26    Sincerely,        SANTIAGO Baker-CNP  [facility]

## 2025-07-29 LAB
CYTOLOGY CMNT CVX/VAG CYTO-IMP: NORMAL
LAB AP HPV GENOTYPE QUESTION: NO
LAB AP HPV HR: NORMAL
LAB AP PAP ADDITIONAL TESTS: NORMAL
LABORATORY COMMENT REPORT: NORMAL
LMP START DATE: NORMAL
MENSTRUAL HX REPORTED: NORMAL
PATH REPORT.TOTAL CANCER: NORMAL
RESIDENT REVIEW: NORMAL

## 2025-08-04 DIAGNOSIS — G47.411 NARCOLEPSY AND CATAPLEXY: ICD-10-CM

## 2025-08-04 PROCEDURE — RXMED WILLOW AMBULATORY MEDICATION CHARGE

## 2025-08-05 RX ORDER — DEXTROAMPHETAMINE SACCHARATE, AMPHETAMINE ASPARTATE MONOHYDRATE, DEXTROAMPHETAMINE SULFATE AND AMPHETAMINE SULFATE 6.25; 6.25; 6.25; 6.25 MG/1; MG/1; MG/1; MG/1
50 CAPSULE, EXTENDED RELEASE ORAL EVERY MORNING
Qty: 60 CAPSULE | Refills: 0 | Status: SHIPPED | OUTPATIENT
Start: 2025-08-05 | End: 2025-09-08

## 2025-08-08 PROCEDURE — RXMED WILLOW AMBULATORY MEDICATION CHARGE

## 2025-08-09 ENCOUNTER — PHARMACY VISIT (OUTPATIENT)
Dept: PHARMACY | Facility: CLINIC | Age: 25
End: 2025-08-09
Payer: COMMERCIAL

## 2025-08-22 ENCOUNTER — ROUTINE PRENATAL (OUTPATIENT)
Dept: OBSTETRICS AND GYNECOLOGY | Facility: HOSPITAL | Age: 25
End: 2025-08-22
Payer: COMMERCIAL

## 2025-08-22 ENCOUNTER — HOSPITAL ENCOUNTER (OUTPATIENT)
Dept: RADIOLOGY | Facility: HOSPITAL | Age: 25
Discharge: HOME | End: 2025-08-22
Payer: COMMERCIAL

## 2025-08-22 VITALS — BODY MASS INDEX: 45.83 KG/M2 | DIASTOLIC BLOOD PRESSURE: 82 MMHG | WEIGHT: 267 LBS | SYSTOLIC BLOOD PRESSURE: 134 MMHG

## 2025-08-22 DIAGNOSIS — Z3A.20 20 WEEKS GESTATION OF PREGNANCY (HHS-HCC): Primary | ICD-10-CM

## 2025-08-22 DIAGNOSIS — Z3A.16 16 WEEKS GESTATION OF PREGNANCY (HHS-HCC): ICD-10-CM

## 2025-08-22 PROBLEM — O99.210 OBESITY IN PREGNANCY (HHS-HCC): Status: ACTIVE | Noted: 2025-08-22

## 2025-08-22 PROCEDURE — 99212 OFFICE O/P EST SF 10 MIN: CPT | Mod: 25

## 2025-08-22 PROCEDURE — 76811 OB US DETAILED SNGL FETUS: CPT

## 2025-08-22 PROCEDURE — 0501F PRENATAL FLOW SHEET: CPT | Performed by: NURSE PRACTITIONER

## 2025-08-22 PROCEDURE — 76811 OB US DETAILED SNGL FETUS: CPT | Performed by: STUDENT IN AN ORGANIZED HEALTH CARE EDUCATION/TRAINING PROGRAM

## 2025-11-24 ENCOUNTER — APPOINTMENT (OUTPATIENT)
Dept: SLEEP MEDICINE | Facility: CLINIC | Age: 25
End: 2025-11-24
Payer: COMMERCIAL